# Patient Record
Sex: MALE | Race: WHITE | NOT HISPANIC OR LATINO | Employment: UNEMPLOYED | URBAN - METROPOLITAN AREA
[De-identification: names, ages, dates, MRNs, and addresses within clinical notes are randomized per-mention and may not be internally consistent; named-entity substitution may affect disease eponyms.]

---

## 2017-05-31 LAB
ALT SERPL-CCNC: 45 IU/L (ref 6–40)
AST SERPL-CCNC: 22 IU/L (ref 10–40)
CREAT SERPL-MCNC: 0.9 MG/DL (ref 0.7–1.2)

## 2018-04-11 ENCOUNTER — TRANSFERRED RECORDS (OUTPATIENT)
Dept: HEALTH INFORMATION MANAGEMENT | Facility: CLINIC | Age: 33
End: 2018-04-11

## 2018-05-08 ENCOUNTER — TRANSFERRED RECORDS (OUTPATIENT)
Dept: HEALTH INFORMATION MANAGEMENT | Facility: CLINIC | Age: 33
End: 2018-05-08

## 2018-05-10 ENCOUNTER — TRANSFERRED RECORDS (OUTPATIENT)
Dept: HEALTH INFORMATION MANAGEMENT | Facility: CLINIC | Age: 33
End: 2018-05-10

## 2018-05-11 ENCOUNTER — TRANSFERRED RECORDS (OUTPATIENT)
Dept: HEALTH INFORMATION MANAGEMENT | Facility: CLINIC | Age: 33
End: 2018-05-11

## 2018-05-11 LAB
CREAT SERPL-MCNC: 0.92 MG/DL (ref 0.7–1.2)
GLUCOSE SERPL-MCNC: 130 MG/DL (ref 70–100)
POTASSIUM SERPL-SCNC: 3.8 MEQ/L (ref 3.4–5.1)

## 2018-05-12 ENCOUNTER — TRANSFERRED RECORDS (OUTPATIENT)
Dept: HEALTH INFORMATION MANAGEMENT | Facility: CLINIC | Age: 33
End: 2018-05-12

## 2018-05-12 ENCOUNTER — HOSPITAL ENCOUNTER (INPATIENT)
Facility: HOSPITAL | Age: 33
LOS: 5 days | Discharge: HOME | DRG: 885 | End: 2018-05-17
Attending: PSYCHIATRY & NEUROLOGY | Admitting: PSYCHIATRY & NEUROLOGY
Payer: MEDICARE

## 2018-05-12 DIAGNOSIS — F25.0 SCHIZOAFFECTIVE DISORDER, BIPOLAR TYPE (H): Primary | ICD-10-CM

## 2018-05-12 PROBLEM — R45.89 SUICIDAL BEHAVIOR: Status: ACTIVE | Noted: 2018-05-12

## 2018-05-12 PROCEDURE — A9270 NON-COVERED ITEM OR SERVICE: HCPCS | Mod: GY | Performed by: NURSE PRACTITIONER

## 2018-05-12 PROCEDURE — 25000132 ZZH RX MED GY IP 250 OP 250 PS 637: Mod: GY | Performed by: NURSE PRACTITIONER

## 2018-05-12 PROCEDURE — 12400000 ZZH R&B MH

## 2018-05-12 RX ORDER — ALUMINA, MAGNESIA, AND SIMETHICONE 2400; 2400; 240 MG/30ML; MG/30ML; MG/30ML
30 SUSPENSION ORAL EVERY 4 HOURS PRN
Status: DISCONTINUED | OUTPATIENT
Start: 2018-05-12 | End: 2018-05-17 | Stop reason: HOSPADM

## 2018-05-12 RX ORDER — SULFAMETHOXAZOLE/TRIMETHOPRIM 800-160 MG
1 TABLET ORAL 2 TIMES DAILY
COMMUNITY
Start: 2018-05-11 | End: 2018-05-18

## 2018-05-12 RX ORDER — OLANZAPINE 10 MG/2ML
10 INJECTION, POWDER, FOR SOLUTION INTRAMUSCULAR
Status: DISCONTINUED | OUTPATIENT
Start: 2018-05-12 | End: 2018-05-17 | Stop reason: HOSPADM

## 2018-05-12 RX ORDER — LEDIPASVIR AND SOFOSBUVIR 90; 400 MG/1; MG/1
1 TABLET, FILM COATED ORAL DAILY
Status: ON HOLD | COMMUNITY
End: 2018-05-12

## 2018-05-12 RX ORDER — SULFAMETHOXAZOLE/TRIMETHOPRIM 800-160 MG
1 TABLET ORAL 2 TIMES DAILY
Status: DISCONTINUED | OUTPATIENT
Start: 2018-05-12 | End: 2018-05-17 | Stop reason: HOSPADM

## 2018-05-12 RX ORDER — TRAZODONE HYDROCHLORIDE 50 MG/1
50 TABLET, FILM COATED ORAL
Status: DISCONTINUED | OUTPATIENT
Start: 2018-05-12 | End: 2018-05-17 | Stop reason: HOSPADM

## 2018-05-12 RX ORDER — BISACODYL 10 MG
10 SUPPOSITORY, RECTAL RECTAL DAILY PRN
Status: DISCONTINUED | OUTPATIENT
Start: 2018-05-12 | End: 2018-05-17 | Stop reason: HOSPADM

## 2018-05-12 RX ORDER — ACETAMINOPHEN 325 MG/1
650 TABLET ORAL EVERY 4 HOURS PRN
Status: DISCONTINUED | OUTPATIENT
Start: 2018-05-12 | End: 2018-05-17 | Stop reason: HOSPADM

## 2018-05-12 RX ORDER — HYDROXYZINE HYDROCHLORIDE 25 MG/1
25-50 TABLET, FILM COATED ORAL EVERY 4 HOURS PRN
Status: DISCONTINUED | OUTPATIENT
Start: 2018-05-12 | End: 2018-05-17 | Stop reason: HOSPADM

## 2018-05-12 RX ORDER — OLANZAPINE 10 MG/1
10 TABLET ORAL
Status: DISCONTINUED | OUTPATIENT
Start: 2018-05-12 | End: 2018-05-17 | Stop reason: HOSPADM

## 2018-05-12 RX ADMIN — OLANZAPINE 10 MG: 10 TABLET, FILM COATED ORAL at 15:49

## 2018-05-12 RX ADMIN — SULFAMETHOXAZOLE AND TRIMETHOPRIM 1 TABLET: 800; 160 TABLET ORAL at 15:50

## 2018-05-12 RX ADMIN — ACETAMINOPHEN 650 MG: 325 TABLET ORAL at 15:49

## 2018-05-12 RX ADMIN — SULFAMETHOXAZOLE AND TRIMETHOPRIM 1 TABLET: 800; 160 TABLET ORAL at 20:12

## 2018-05-12 ASSESSMENT — ACTIVITIES OF DAILY LIVING (ADL)
TOILETING: 0-->INDEPENDENT
COGNITION: 0 - NO COGNITION ISSUES REPORTED
SWALLOWING: 0-->SWALLOWS FOODS/LIQUIDS WITHOUT DIFFICULTY
FALL_HISTORY_WITHIN_LAST_SIX_MONTHS: NO
DRESS: 0-->INDEPENDENT
BATHING: 0-->INDEPENDENT
TRANSFERRING: 0-->INDEPENDENT
RETIRED_COMMUNICATION: 0-->UNDERSTANDS/COMMUNICATES WITHOUT DIFFICULTY
AMBULATION: 0-->INDEPENDENT
RETIRED_EATING: 0-->INDEPENDENT

## 2018-05-12 NOTE — PLAN OF CARE
Face to face end of shift report received from [employer] completed. Patient observed laying in bed napping with regular and unlabored respirations. Pt requested bactrim, tylenol, and zyprexa that he had refused after requesting it earlier for agitation, generalized pain, and infection to right upper antecubital space of arm. Pt was admin all requested meds at 1549. No further complaints or concerns reported by pt at this time.     Charissa Starkey  5/12/2018  3:45 PM

## 2018-05-12 NOTE — PLAN OF CARE
Staff tried to fax [facility] for medical records since pt signed PAOLA.  It is sitting in the front of the chart.

## 2018-05-12 NOTE — IP AVS SNAPSHOT
STOP!!! DO NOT PRINT OR REFERENCE THIS AVS!!!  AVS displayed here is NOT the version that was given to the patient at discharge.  GO TO CHART REVIEW to print or review a copy of the AVS that was frozen/printed at time of discharge.                           MRN:[id number]                      After Visit Summary   5/12/2018    Nima Pandya    MRN: [id number]           Thank you!     Thank you for choosing [facility] for your care. Our goal is always to provide you with excellent care. Hearing back from our patients is one way we can continue to improve our services. Please take a few minutes to complete the written survey that you may receive in the mail after you visit with us. Thank you!        Patient Information     Date Of Birth          1985        Designated Caregiver       Most Recent Value    Caregiver    Will someone help with your care after discharge? no    Name of designated caregiver none    Phone number of caregiver none    Caregiver address none      About your hospital stay     You were admitted on:  May 12, 2018 You last received care in the: [facility]    You were discharged on:  May 17, 2018       Who to Call     For medical emergencies, please call 911.  For non-urgent questions about your medical care, please call your primary care provider or clinic, None          Attending Provider     Provider Specialty    Juan Jose Huang MD Psychiatry    [place], [facility]       Primary Care Provider Fax #    Physician No Ref-Primary [phone]      Further instructions from your care team       Behavioral Discharge Planning and Instructions    Summary: Nima was admitted to  with increased mental health symptoms     Main Diagnosis: Schizophrenia, paranoid type, R/o substance (amphetamine) induced psychosis, Stimulant (amphetamine) use disorder, severe, Opiate use disorder, severe    Major Treatments, Procedures and Findings: Stabilize with medications, connect  with community programs.    Symptoms to Report: feeling more aggressive, increased confusion, losing more sleep, mood getting worse or thoughts of suicide    Lifestyle Adjustment: Take all medications as prescribed, meet with doctor/ medication provider, out patient therapist, , and [facility] worker as scheduled. Abstain from alcohol or any unprescribed drugs.      Psychiatry Follow-up:     [facility]   - Kristyn - [phone]  [address] [place] [address]  Phone- [phone]     Fax- [phone]       [facility]  - referral faxed on 5/15/2018  Irma - [phone]  [address]  [place] [address]  Phone: [phone]  Fax: [phone] or [phone]    [facility]   Jackie Shoemaker   Phone: [phone]  Fax: [phone]      Resources:   Crisis Intervention: 284.944.4305 or 281-716-9492 (TTY: 513.347.1635).  Call anytime for help.  National [place] on Mental Illness (www.[place].justina.org): 693.331.7434 or 788-871-4902.  Alcoholics Anonymous (www.alcoholics-anonymous.org): Check your phone book for your local chapter.  Suicide Awareness Voices of Education (SAVE) (www.save.org): 385-089-BZDC (6554)  National Suicide Prevention Line (www.mentalhealthmn.org): 249-456-HOPL (6292)  Mental Health Consumer/Survivor Network of [place] (www.mhcsn.net): 708.557.2243 or 839-984-3051  Mental Health Association of [place] (www.mentalhealth.org): 611.348.9895 or 776-332-6786    General Medication Instructions:   See your medication sheet(s) for instructions.   Take all medicines as directed.  Make no changes unless your doctor suggests them.   Go to all your doctor visits.  Be sure to have all your required lab tests. This way, your medicines can be refilled on time.  Do not use any drugs not prescribed by your doctor.  Avoid alcohol.    [place] Area:  [facility] stabilization [facility]- [phone]  [facility] Crisis Line:  "1-929.992.9506  Advocates For Family Peace: 372-0611  Sexual Assault Program of [facility]: 428.397.1723 or 1-669.241.7921  [place] Battered Women's Program: 1-794.460.2544 Ext: 279       Calls answered Mon-Fri-8:00 am--4:30 pm    [place]:  Advocates for Family Peace: 1-283.661.7275  [facility] first call for help: 1-133.536.2967  [facility] Crisis [facility]:  (556) 923-9462      [place] Area:  Warm Line: 1-139.774.7895       Calls answered Tuesday--Saturday 4:00 pm--10:00 pm  [facility] Crisis Line - 773.742.2981  [place] [facility] Stabilization 724-977-5554    [place] Statewide:  [place] Crisis and Referral Services: 1-333.667.7782  National Suicide Prevention Lifeline: 2-321-228-TALK (6552)   - oas5qkke- Text  Life  to [address]62  First Call for Help: 2-1-1  [facility] Helpline- 3-041-BTDH-HELP    Pending Results     No orders found from 5/10/2018 to 5/13/2018.            Statement of Approval     Ordered          05/16/18 7313  I have reviewed and agree with all the recommendations and orders detailed in this document.  EFFECTIVE NOW     Approved and electronically signed by:  Laya Felix NP             Admission Information     Date & Time Department Dept. Phone    5/12/2018 [facility] [phone]      Your Vitals Were     Blood Pressure Pulse Temperature Respirations Height Weight    131/57 87 97.5  F (36.4  C) (Tympanic) 14 1.956 m (6' 5\") 119.5 kg (263 lb 7.2 oz)    Pulse Oximetry BMI (Body Mass Index)                95% 31.24 kg/m2          Novelo Information     Novelo lets you send messages to your doctor, view your test results, renew your prescriptions, schedule appointments and more. To sign up, go to [email or web address] . Click on \"Log in\" on the left side of the screen, which will take you to the Welcome page. Then click on \"Sign up Now\" on the right side of the page.     You will be asked to enter the access code listed below, as well as some personal information. Please " follow the directions to create your username and password.     Your access code is: [email or web address]  Expires: 2018 12:03 PM     Your access code will  in 90 days. If you need help or a new code, please call your [facility] clinic or [phone].        Care EveryWhere ID     This is your Care EveryWhere ID. This could be used by other organizations to access your [facility] medical records  [id number]        Equal Access to Services     LIVE DIAZ : Hadii aad ku hadasho Soomaali, waaxda luqadaha, qaybta kaalmada adeegyada, waxay dudleyin haykaterina claros. So [facility] [phone].    ATENCIÓN: Si habla español, tiene a martínez disposición servicios gratuitos de asistencia lingüística. [facility] [phone].    We comply with applicable federal civil rights laws and [place] laws. We do not discriminate on the basis of race, color, national origin, age, disability, sex, sexual orientation, or gender identity.               Review of your medicines      CONTINUE these medicines which may have CHANGED, or have new prescriptions. If we are uncertain of the size of tablets/capsules you have at home, strength may be listed as something that might have changed.        Dose / Directions    paliperidone 234 MG/1.5ML Susp   Commonly known as:  INVEGA SUSTENNA   This may have changed:  when to take this   Used for:  Schizoaffective disorder, bipolar type (H)        Dose:  234 mg   Start taking on:  2018   Inject 1.5 mLs (234 mg) into the muscle every 28 days   Quantity:  1 Syringe   Refills:  0         CONTINUE these medicines which have NOT CHANGED        Dose / Directions    sulfamethoxazole-trimethoprim 800-160 MG per tablet   Commonly known as:  BACTRIM DS/SEPTRA DS   Indication:  Abscess        Dose:  1 tablet   Take 1 tablet by mouth 2 times daily   Refills:  0         STOP taking     INVEGA PO                Where to get your medicines      These medications were sent to [id number] -  [place] - [address] [facility] [address]  [address] [place] [address]    Hours:  24-hours Phone:  [phone]     paliperidone 234 MG/1.5ML Susp                Protect others around you: Learn how to safely use, store and throw away your medicines at www.disposemymeds.org.        ANTIBIOTIC INSTRUCTION     You've Been Prescribed an Antibiotic - Now What?  Your healthcare team thinks that you or your loved one might have an infection. Some infections can be treated with antibiotics, which are powerful, life-saving drugs. Like all medications, antibiotics have side effects and should only be used when necessary. There are some important things you should know about your antibiotic treatment.      Your healthcare team may run tests before you start taking an antibiotic.    Your team may take samples (e.g., from your blood, urine or other areas) to run tests to look for bacteria. These test can be important to determine if you need an antibiotic at all and, if you do, which antibiotic will work best.      Within a few days, your healthcare team might change or even stop your antibiotic.    Your team may start you on an antibiotic while they are working to find out what is making you sick.    Your team might change your antibiotic because test results show that a different antibiotic would be better to treat your infection.    In some cases, once your team has more information, they learn that you do not need an antibiotic at all. They may find out that you don't have an infection, or that the antibiotic you're taking won't work against your infection. For example, an infection caused by a virus can't be treated with antibiotics. Staying on an antibiotic when you don't need it is more likely to be harmful than helpful.      You may experience side effects from your antibiotic.    Like all medications, antibiotics have side effects. Some of these can be serious.    Let you healthcare team know if you  have any known allergies when you are admitted to the hospital.    One significant side effect of nearly all antibiotics is the risk of severe and sometimes deadly diarrhea caused by Clostridium difficile (C. Difficile). This occurs when a person takes antibiotics because some good germs are destroyed. Antibiotic use allows C. diificile to take over, putting patients at high risk for this serious infection.    As a patient or caregiver, it is important to understand your or your loved one's antibiotic treatment. It is especially important for caregivers to speak up when patients can't speak for themselves. Here are some important questions to ask your healthcare team.    What infection is this antibiotic treating and how do you know I have that infection?    What side effects might occur from this antibiotic?    How long will I need to take this antibiotic?    Is it safe to take this antibiotic with other medications or supplements (e.g., vitamins) that I am taking?     Are there any special directions I need to know about taking this antibiotic? For example, should I take it with food?    How will I be monitored to know whether my infection is responding to the antibiotic?    What tests may help to make sure the right antibiotic is prescribed for me?      Information provided by:  www.cdc.gov/getsmart  U.S. Department of Health and Human Services  Centers for disease Control and Prevention  National Center for Emerging and Zoonotic Infectious Diseases  Division of Healthcare Quality Promotion             Medication List: This is a list of all your medications and when to take them. Check marks below indicate your daily home schedule. Keep this list as a reference.      Medications           Morning Afternoon Evening Bedtime As Needed    paliperidone 234 MG/1.5ML Susp   Commonly known as:  INVEGA SUSTENNA   Inject 1.5 mLs (234 mg) into the muscle every 28 days   Start taking on:  6/13/2018   Last time this was  given:  234 mg on 5/16/2018 10:47 AM                                sulfamethoxazole-trimethoprim 800-160 MG per tablet   Commonly known as:  BACTRIM DS/SEPTRA DS   Take 1 tablet by mouth 2 times daily   Last time this was given:  1 tablet on 5/16/2018  8:26 PM

## 2018-05-12 NOTE — PROGRESS NOTES
05/12/18 1345   Patient Belongings   Did you bring any home meds/supplements to the hospital?  No   Patient Belongings wallet   Disposition of Belongings Other (see comment)  (in belonging room)   Belongings Search Yes   Clothing Search Yes   Second Staff Charissa CORONADO   General Info Comment Grey underwear X3, Green Bracelete, Black T shirt X2, Grey T shirt X3, Black [place] jacket, Maroon underweat X2, Yellow plaid boxers, White T shirt, Pair of grey socks X11, Red shirt X2, Black underwear, Grey boxers, Tresemme conditioner, Black shorts, Green sweatshirt, black sunglasses, Tresemme shampoo, black metal lantern light, axe body wash, floss X2, axe deodorant X2, black , old spice doedorant, blue plaid boxers, red shorts X2, Blue shorts, pack of pall mall w/one cigarette in it, bag of cat treats (sealed), black shorts, folder with papers in, pack of american spirits, cell tronix power bank    List items sent to safe: Pawn sarah slip, S.S. Card, medical cards X2, [place] DL, [employer] rewards card, paypal mastercard, credit one visa card, [employer] visa debit, various business and rewards cards, black wallet  All other belongings put in assigned cubby in belongings room.     I have reviewed my belongings list on admission and verify that it is correct.     Patient signature_______________________________    Second staff witness (if patient unable to sign) ______________________________       I have received all my belongings at discharge.    Patient signature________________________________    Rylee   5/12/2018  1:59 PM

## 2018-05-12 NOTE — PLAN OF CARE
Problem: Patient Care Overview  Goal: Individualization & Mutuality  Pt will be med compliant.  Pt will agree with treatment teams recommendations  Pt will have reality based conversations  Pt elopement precautions.  Pt will be assessed daily to see if able to wean. 5/12/18 No weaning currently.    Outcome: Therapy, progress toward functional goals is gradual  Pt was initially refusing requested medications, but then right after shift change requested them again and took them this time. Pt requested bactrim, tylenol, and zyprexa that he had refused after requesting it earlier for agitation, generalized pain, and infection to right upper antecubital space of arm. Pt was admin all requested meds at 1549. Pt is still very delusional and makes comments that are sometimes illogical and tangential. Pt was brought the medications he requested the first time at around 1215 which he refused and stated he wanted us to quit pulling him back to this place and let him sleep. Request was honored to allow pt to sleep. 1337- Writer and pt RN attempted again to bring pt the meds as he appeared agitated and still needed to take the scheduled bactrim. Pt again refused and said that we were pulling him back from heaven and would appreciate it if we left him alone and that he did not want the meds at this time. Pt rambled some more incoherent comments regarding heaven and being high before sounding agitated again. Pt request to sleep and be left alone was again honored until he  Came and requested the medications himself at 1545.     Per report pt was carrying around a shovel with a plan to bury himself before being brought into the hospital. He is an IV drug user and is Hep C+. He also has diagnoses of continuous opiate dependence, with prescription for suboxone and invega sustenna injection that he receives every 28 days. Pt is agitated and mostly uncooperative with staff. He has spent the entire day shift and now going into evening  shift in bed and not wanting to be bothered with anyone or anything. He is extremely isolative and withdrawn. The zyprexa seems to have reduced pt agitation. Pt refuses to answer criteria questions and again stated that he wants to be left alone. Pt is unable to maintain a reality based conversation at this time.     Medical records received from [facility] and placed in the front of the pt chart    Problem: Overarching Goals (Adult)  Goal: Adheres to Safety Considerations for Self and Others  Outcome: Therapy, progress toward functional goals is gradual  Pt has been safe with himself     Goal: Optimized Coping Skills in Response to Life Stressors    Intervention: Promote Effective Coping Strategies  Pt is delusional at this time and still not presenting well. Coping skills are ineffective at this time and not being utilized.     Goal: Develops/Participates in Therapeutic [place] to Support Successful Transition    Intervention: Foster Therapeutic [place]  Pt desire to sleep is being honored at this time.      Problem: Thought Process Alteration (Adult)  Goal: Improved Thought Process  Pt. Will have no hallucinations by target discharge date.  Pt. Will manage a reality based conversation.    Outcome: Therapy, unable to show any progress toward functional goals  Pt refuses to answer criteria questions but appears responding and unable to maintain reality at this time.

## 2018-05-12 NOTE — IP AVS SNAPSHOT
[facility]    [address] [place] [address]    Phone:  [phone]    Fax:  [phone]                                       After Visit Summary   5/12/2018    Nima Pandya    MRN: [id number]           After Visit Summary Signature Page     I have received my discharge instructions, and my questions have been answered. I have discussed any challenges I see with this plan with the nurse or doctor.    ..........................................................................................................................................  Patient/Patient Representative Signature      ..........................................................................................................................................  Patient Representative Print Name and Relationship to Patient    ..................................................               ................................................  Date                                            Time    ..........................................................................................................................................  Reviewed by Signature/Title    ...................................................              ..............................................  Date                                                            Time
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
## 2018-05-13 PROCEDURE — A9270 NON-COVERED ITEM OR SERVICE: HCPCS | Mod: GY | Performed by: NURSE PRACTITIONER

## 2018-05-13 PROCEDURE — 12400000 ZZH R&B MH

## 2018-05-13 PROCEDURE — 99223 1ST HOSP IP/OBS HIGH 75: CPT | Performed by: NURSE PRACTITIONER

## 2018-05-13 PROCEDURE — 25000132 ZZH RX MED GY IP 250 OP 250 PS 637: Mod: GY | Performed by: NURSE PRACTITIONER

## 2018-05-13 RX ADMIN — SULFAMETHOXAZOLE AND TRIMETHOPRIM 1 TABLET: 800; 160 TABLET ORAL at 20:33

## 2018-05-13 RX ADMIN — HYDROXYZINE HYDROCHLORIDE 50 MG: 25 TABLET ORAL at 20:37

## 2018-05-13 RX ADMIN — SULFAMETHOXAZOLE AND TRIMETHOPRIM 1 TABLET: 800; 160 TABLET ORAL at 08:23

## 2018-05-13 ASSESSMENT — ACTIVITIES OF DAILY LIVING (ADL)
GROOMING: INDEPENDENT
ORAL_HYGIENE: INDEPENDENT
DRESS: SCRUBS (BEHAVIORAL HEALTH)
GROOMING: INDEPENDENT
LAUNDRY: UNABLE TO COMPLETE
ORAL_HYGIENE: INDEPENDENT

## 2018-05-13 NOTE — PLAN OF CARE
Face to face end of shift report received from Charissa HEREDIA RN. Rounding completed. Patient observed in [facility] room.     Samantha Fall  5/13/2018  4:01 PM

## 2018-05-13 NOTE — PLAN OF CARE
Problem: Patient Care Overview  Goal: Individualization & Mutuality  Pt will be med compliant.  Pt will agree with treatment teams recommendations  Pt will have reality based conversations  Pt elopement precautions.  Pt will be assessed daily to see if able to wean. 5/12/18 No weaning currently.    Outcome: No Change  Pt. Is medication compliant this shift, irritable with any questions, no weaning to open unit at this time, has been in bed all shift, will continue to monitor progress.    Problem: Thought Process Alteration (Adult)  Goal: Improved Thought Process  Pt. Will have no hallucinations by target discharge date.  Pt. Will manage a reality based conversation.    Outcome: No Change  Pt. Not willing to answer any assessment questions.

## 2018-05-13 NOTE — H&P
"Psychiatric Eval/H&P  Patient Name: Nima Pandya   YOB: 1985  Age: 32 year old  [id number]    Primary Physician: No Ref-Primary, Physician   Completed By: Sri Cage NP     CC: Admitted for psychosis    Patient is a poor historian so information is largely obtained from review of available records and brief interaction with the patient.    HPI   Nima Pandya is a 32 year old single  male who presented to the [facility] ED via law enforcement for psychosis. He had been discharged from [facility] 2 days earlier, on 5/10/18. Upon discharge at that time he was admitted to [facility] for CD treatment. Due to his erratic behavior at the treatment facility it was requested that [facility] evaluate him. Before they arrived he eloped. Father found him at his apartment and brought him to [facility]. He reportedly eloped from [facility] twice yesterday before police found him in the woods and brought him to the ED for further evaluation. He reported the use of methamphetamines, suboxone, and benzos earlier that day (5/11) to the .     Nima offers little information on assessment today and states \"I'd prefer not to talk about it. It's old news to me\". He was noted to have a large, scabbed over area on his right inner antecubital area that is currently being treated for cellulitis. This is secondary to continued IV drug use. He had initially refused antibiotics, stating \"this is mine and I have the right to have my body heal the way its wants to\". He did make several odd statements to nursing staff after admission here, noting that he needed to be left alone, \"quit pulling me back from heaven\". Unable to explain why he left treatment or [facility]. It is unclear whether he has been taking his Invega Sustenna injection, will need to verify this on Monday.           PMFSP     Past Psychiatric History:   History of multiple hospitalizations in the past. Recently " hospitalized at [facility] from 5/8/18-5/10/18 and was discharged to [facility]. Currently works with [employer] in [place]. Known history of paranoid schizophrenia. Has been under commitment in the past. Has been taking Invega Sustenna IM, has been on Abilify in the past per records and likely others.      Social History:   Per records he lived in [place] as a young child. Has been in [place] for most of his life. Has 3 brothers, one who committed suicide in 2001. He has a GED. Currently living in his father's rental house in [place], father currently lives in [place]. He is currently collecting SSDI. No children. Currently on probation until 6/2019 for theft and drug related charges. PO is Navid Swift.         Chemical Use History:   Has a significant substance abuse history. Per records he started using alcohol and marijuana at the age of 14. Used meth for the first time at age 16. He was 27 years old when he started using opiates regularly, including suboxone and heroin. Has also used methamphetamine regularly since. Has been using Subutex IV. Currently has area of cellulitis on right inner antecubital area from IV drug use. Reported in the ED that he had last used suboxone, benzos, methamphetamine, and a shot of alcohol the day prior to coming to the ED. Has been to CD treatment several times, including [facility] in October 2017. Was discharged from the hospital on 5/10/18 to [facility] for treatment, though eloped.       Family Psychiatric History:   Unknown at this time.        Medical History and ROS  No current outpatient prescriptions on file.     No Known Allergies     Past medical history:  Hepatitis C+    No past surgical history on file.      Physical Exam    Constitutional: oriented to person, place, and time.    HENT:   Head: Normocephalic and atraumatic.   Right Ear: External ear normal.   Left Ear: External ear normal.   Nose: Nose normal.   Mouth/Throat: Oropharynx is clear and moist.    Eyes: Conjunctivae and EOM are  "normal.   Neck: Normal range of motion.    Cardiovascular: Normal rate, regular rhythm  Pulmonary/Chest: Effort normal and breath sounds normal.   Abdominal: Soft. Bowel sounds are normal.    Skin: Large scabbed over area to right inner antecubital area, skin reddened, possible cellulitis    Review of Systems:  Constitution: No weight loss, fever, night sweats  Skin: No rashes, pruritus or open wounds  Neuro: No headaches or seizure activity.  Psych:  See HPI  Eyes: No vision changes.  ENT: No problems chewing or swallowing.   Musculoskeletal: No muscle pain, joint pain or swelling   Respiratory: No cough or dyspnea  Cardiovascular:  No chest pain,  palpitations or fainting  Gastrointestinal:  No abdominal pain, nausea, vomiting or change in bowel habits         MSE/PSYCH  PSYCHIATRIC EXAM  /87  Pulse 97  Temp 98.2  F (36.8  C) (Tympanic)  Resp 16  Ht 1.956 m (6' 5\")  Wt 119.5 kg (263 lb 7.2 oz)  SpO2 95%  BMI 31.24 kg/m2  -Appearance/Behavior:  No apparent distress, Casually groomed and Appears stated age    -Attitude:guarded, irritable  -Motor: normal or unremarkable  -Gait: not observed as he is laying in bed, reportedly normal   -Abnormal involuntary movements: none noted  -Mood: irritable, labile  -Affect: Labile.  -Speech: regular rate and rhythm, coherent                 -Thought process/associations: Tangential, difficult to assess as he offers very little during assessment  -Thought content: difficult to assess, has been making delusional statements to nursing staff  -Perceptual disturbances: denies hallucinations, though appears somewhat peroccupied              -Suicidal/Homicidal Ideation: denies any suicidal or homicidal ideation  -Judgment: Limited.  -Insight: Limited.  *Orientation: time, place and person.  *Memory: likely impaired secondary to drug use  *Attention: short, refuses to speak with me  *Language: fluent, no aphasias, able to repeat phrases and name objects. Vocab " "intact.  *Fund of information: appropriate for education  *Cognitive functioning estimate: 0 - independent.     Labs:   Preadmission labs reviewed and in paper chart. Utox +amphetamines, ETOH negative. BMP is WNL. CBC remarkable only for slight elevation in WBC 11.6. Ultrasound completed on right upper arm to rule out abscess- \"no focal fluid collection identified\".     Assessment/Impression: This is a 32 year old yo male with a history of schizophrenia and polysubstance abuse. Was recently hospitalized and discharged to  treatment, though eloped the following day. He admitted to the use of benzos, meth, suboxone, and alcohol in the morning prior to coming to ED. He is an IV drug user and has large scabbed over area on right inner antecubital injection site which is currently being treated for cellulitis. Is taking antibiotics for this. He is uncooperative with assessment today. It is unclear whether he has been taking his Invega Sustenna injection, will need to verify with [facility] on Monday when this is due.       Educated regarding medication indications, risks, benefits, side effects, contraindications and possible interactions. Verbally expressed understanding.     DX:  Schizophrenia, paranoid type  R/o substance (amphetamine) induced psychosis  Stimulant (amphetamine) use disorder, severe  Opiate use disorder, severe     Plan:  Admit to Unit: [address] [place] [address]  Attending: Sri Cage CNP  Patient is on a 72 hour mental health hold  Other routine labs were notable for utox +amphetamines  Monitor for target symptoms: decrease in psychosis  Provide a safe environment and therapeutic milieu.   Continue Invega Sustenna- need to verify when next dose is due  Bactrim DS BID x 7 days for likely cellulitis of right arm, started in ED    Anticipated length of stay: 3-5 days       CLOVER Martinez, LAUREL  "

## 2018-05-13 NOTE — PLAN OF CARE
Face to face end of shift report received from pm RN. Rounding completed. Patient observed.     Jose Bynum  5/12/2018  11:53 PM

## 2018-05-13 NOTE — PHARMACY
[facility]    Pharmacy      Antimicrobial Stewardship Note     Current antimicrobial therapy:  Anti-infectives (Future)    Start     Dose/Rate Route Frequency Ordered Stop    05/12/18 1315  sulfamethoxazole-trimethoprim (BACTRIM DS/SEPTRA DS) 800-160 MG per tablet 1 tablet      1 tablet Oral 2 TIMES DAILY 05/12/18 1303 05/18/18 2059          Indication: Abscess, SSTI    Days of Therapy: 2     Pertinent labs:  Creatinine No results found for: CR  WBC No results found for: WBC  Procalcitonin No results found for: PCAL  CRP No results found for: CRP    Culture Results: None     Recommendations/Interventions:  1. No recommendations at this time    Ashleigh Hutton, [facility]  May 13, 2018

## 2018-05-13 NOTE — PLAN OF CARE
Face to face end of shift report received from Jose TERRY RN. Rounding completed. Patient observed.     Charissa Youngblood  5/13/2018  7:41 AM

## 2018-05-14 ENCOUNTER — OFFICE VISIT (OUTPATIENT)
Dept: WOUND CARE | Facility: OTHER | Age: 33
DRG: 885 | End: 2018-05-14
Attending: PSYCHIATRY & NEUROLOGY
Payer: MEDICARE

## 2018-05-14 DIAGNOSIS — T14.8XXA WOUND OF SKIN: Primary | ICD-10-CM

## 2018-05-14 PROCEDURE — 12400000 ZZH R&B MH

## 2018-05-14 PROCEDURE — A9270 NON-COVERED ITEM OR SERVICE: HCPCS | Mod: GY | Performed by: NURSE PRACTITIONER

## 2018-05-14 PROCEDURE — 25000132 ZZH RX MED GY IP 250 OP 250 PS 637: Mod: GY | Performed by: NURSE PRACTITIONER

## 2018-05-14 PROCEDURE — 99232 SBSQ HOSP IP/OBS MODERATE 35: CPT | Performed by: NURSE PRACTITIONER

## 2018-05-14 PROCEDURE — 99232 SBSQ HOSP IP/OBS MODERATE 35: CPT | Performed by: CLINICAL NURSE SPECIALIST

## 2018-05-14 RX ORDER — CLONIDINE HYDROCHLORIDE 0.1 MG/1
0.1 TABLET ORAL 3 TIMES DAILY PRN
Status: DISCONTINUED | OUTPATIENT
Start: 2018-05-14 | End: 2018-05-16

## 2018-05-14 RX ADMIN — NICOTINE POLACRILEX 4 MG: 2 GUM, CHEWING ORAL at 20:15

## 2018-05-14 RX ADMIN — OLANZAPINE 10 MG: 10 TABLET, FILM COATED ORAL at 13:24

## 2018-05-14 RX ADMIN — SULFAMETHOXAZOLE AND TRIMETHOPRIM 1 TABLET: 800; 160 TABLET ORAL at 20:15

## 2018-05-14 RX ADMIN — CLONIDINE HYDROCHLORIDE 0.1 MG: 0.1 TABLET ORAL at 13:24

## 2018-05-14 RX ADMIN — HYDROXYZINE HYDROCHLORIDE 50 MG: 25 TABLET ORAL at 20:15

## 2018-05-14 RX ADMIN — NICOTINE POLACRILEX 4 MG: 2 GUM, CHEWING ORAL at 18:36

## 2018-05-14 RX ADMIN — SULFAMETHOXAZOLE AND TRIMETHOPRIM 1 TABLET: 800; 160 TABLET ORAL at 08:27

## 2018-05-14 ASSESSMENT — ACTIVITIES OF DAILY LIVING (ADL)
DRESS: SCRUBS (BEHAVIORAL HEALTH);INDEPENDENT
GROOMING: INDEPENDENT
ORAL_HYGIENE: INDEPENDENT
GROOMING: WITH SUPERVISION
LAUNDRY: UNABLE TO COMPLETE

## 2018-05-14 NOTE — PHARMACY
[facility]    Pharmacy      Antimicrobial Stewardship Note     Current antimicrobial therapy:  Anti-infectives (Future)    Start     Dose/Rate Route Frequency Ordered Stop    05/12/18 1315  sulfamethoxazole-trimethoprim (BACTRIM DS/SEPTRA DS) 800-160 MG per tablet 1 tablet      1 tablet Oral 2 TIMES DAILY 05/12/18 1303 05/18/18 2059          Indication: abscess, SSTI    Days of Therapy: 3     Pertinent labs:  Creatinine No results found for: CR  WBC No results found for: WBC  Procalcitonin No results found for: PCAL  CRP No results found for: CRP    Culture Results: none     Recommendations/Interventions:  1. Wound due to IV drug use, most likely.  Follow wound healing, if no progress, switch to an antibiotic with broader coverage.      Dipti Alas RPH  May 14, 2018

## 2018-05-14 NOTE — MR AVS SNAPSHOT
"              After Visit Summary   2018    Nima Pandya    MRN: [id number]           Patient Information     Date Of Birth          1985        Visit Information        Provider Department      2018 9:30 AM Mandy Rueda APRN CNS [facility]        Today's Diagnoses     Wound of skin    -  1       Follow-ups after your visit        Who to contact     If you have questions or need follow up information about today's clinic visit or your schedule please contact [facility] directly at [phone].  Normal or non-critical lab and imaging results will be communicated to you by [employer], letter or phone within 4 business days after the clinic has received the results. If you do not hear from us within 7 days, please contact the clinic through [employer] or phone. If you have a critical or abnormal lab result, we will notify you by phone as soon as possible.  Submit refill requests through [employer] or call your pharmacy and they will forward the refill request to us. Please allow 3 business days for your refill to be completed.          Additional Information About Your Visit        MyChart Information     [employer] lets you send messages to your doctor, view your test results, renew your prescriptions, schedule appointments and more. To sign up, go to [email or web address] . Click on \"Log in\" on the left side of the screen, which will take you to the Welcome page. Then click on \"Sign up Now\" on the right side of the page.     You will be asked to enter the access code listed below, as well as some personal information. Please follow the directions to create your username and password.     Your access code is: [email or web address]  Expires: 2018 12:03 PM     Your access code will  in 90 days. If you need help or a new code, please call your [facility] or [phone].        Care EveryWhere ID     This is your Care EveryWhere ID. This could be used by other " organizations to access your [facility] medical records  [id number]         Blood Pressure from Last 3 Encounters:   05/14/18 135/68    Weight from Last 3 Encounters:   05/13/18 119.5 kg (263 lb 7.2 oz)              Today, you had the following     No orders found for display         Today's Medication Changes      Notice     This visit is during an admission. Changes to the med list made in this visit will be reflected in the After Visit Summary of the admission.             Primary Care Provider Fax #    Physician No Ref-Primary [phone]       No address on file        Equal Access to Services     [facility]: Hadii brian casas [place], waaxda luqadaha, qaybta kaalmada bharathyaashley, dudley hooker . So [facility] [phone].    ATENCIÓN: Si habla español, tiene a martínez disposición servicios gratuitos de asistencia lingüística. Llame al [phone].    We comply with applicable federal civil rights laws and [place] laws. We do not discriminate on the basis of race, color, national origin, age, disability, sex, sexual orientation, or gender identity.            Thank you!     Thank you for choosing [facility]  for your care. Our goal is always to provide you with excellent care. Hearing back from our patients is one way we can continue to improve our services. Please take a few minutes to complete the written survey that you may receive in the mail after your visit with us. Thank you!             Your Updated Medication List - Protect others around you: Learn how to safely use, store and throw away your medicines at www.disposemymeds.org.      Notice     This visit is during an admission. Changes to the med list made in this visit will be reflected in the After Visit Summary of the admission.

## 2018-05-14 NOTE — CONSULTS
SUBJECTIVE:  Nima Pandya, 32 year old, male presents with the following Chief Complaint(s) with HPI to follow: Wound Care       HPI:  Wound care was asked to consult on a wound on Nima's right arm, superior to the antecubital area.      Patient is cooperative with exam but not willing to discuss history today, therefore information is gleaned from the medical record and report from the nurses.  It is reported that the injury is due to IV drug use.  The area was scabbed on admission, but he picked the scab off on 5/13/2018 and requested a band aid to cover it.  He was started on Bactrim DS in the ED.  He denies pain at this time and is a little exasperated that I even want to look at the wound today.    Patient Active Problem List   Diagnosis     Suicidal behavior       No past medical history on file.    No past surgical history on file.    No family history on file.    Social History   Substance Use Topics     Smoking status: Not on file     Smokeless tobacco: Not on file     Alcohol use Not on file       No current outpatient prescriptions on file.       No Known Allergies    REVIEW OF SYSTEMS  Skin: as above  Respiratory: No shortness of breath  Musculoskeletal: negative and injury to right arm, superior to antecubital area  Psychiatric: illegal drug usage    OBJECTIVE:    B/P: 135/68, T: 99.6, P: 70, R: 16, W: 263 lbs 7.2 oz, BMI: Body mass index is 31.24 kg/(m^2).  Constitutional: alert and no distress  Respiratory:  Good diaphragmatic excursion.  Musculoskeletal: extremities normal- no gross deformities noted and normal muscle tone  Skin:        Wound description:     Type of Wound:  ulcer   Location:  Right arm, superior to antecubital area    Drainage amount:  small   Drainage color:  red   Odor:  none   Wound bed:  red   Surrounding skin:  Edges attached        Measurements:  2.3 x 1.7 cm   Pain:  denies       Dressing change:      Wound dressed with oil emulsion gauze, covered with dry gauze,  affixed with medipore tape.      Psychiatric: mentation appears normal and irritated with cares    THERAPY GOAL:    Prevent infection   Moisture balance   Wound healing    ASSESSMENT / PLAN:    Comments:  We encouraged him to shower daily to clean the wound and then have the dressing replaced.  He verbalized understanding but staff report that he mostly lays in bed and has not been interested in showering.    Plan:  Daily dressing change with oil emulsion gauze, covered with dry gauze, affixed with medipore tape.  It is ok to remove the dressing for shower daily, then apply new dressing.    Follow-up as needed for acute concerns.    Mandy Rueda CNS  Surgery and Wound Care  [place] [facility]

## 2018-05-14 NOTE — PLAN OF CARE
Problem: Patient Care Overview  Goal: Team Discussion  Team Plan:   BEHAVIORAL TEAM DISCUSSION    Participants: Margarita Reyes NP, Olivia Felipe NP,  Sri Cage NP, Louise Calderon LSW,  Ailny Grant LSW, Joanna Mendez [facility], Sonja Dejesus RN, Aicha Schaefer Recreation Therapy, Olivia Santiago OT  Progress: New patient   Continued Stay Criteria/Rationale: Continue Invega Sustenna   Medical/Physical: Hep C, Cellulitis on right arm   Precautions:   Behavioral Orders   Procedures     Code 1 - Restrict to Unit     Routine Programming     As clinically indicated     Status 15     Every 15 minutes.     Plan: SW to assess, Stabilize, Verify when next injection is due, Possibly file petition for commitment   Rationale for change in precautions or plan: None

## 2018-05-14 NOTE — PLAN OF CARE
Face to face end of shift report received from pm RN. Rounding completed. Patient observed.     Jose Bynum  5/13/2018  11:52 PM

## 2018-05-14 NOTE — PLAN OF CARE
Problem: Patient Care Overview  Goal: Individualization & Mutuality  Pt will be med compliant.  Pt will agree with treatment teams recommendations  Pt will have reality based conversations  Pt elopement precautions.  Pt will not wean today; To be readdressed daily.   Outcome: No Change  Patient remains in MHICU and is not weaning today. Patient irritable throughout the day, ate some of breakfast and some of lunch meal. Patient resting on bed and sleeping throughout the day. Tells writer that he really needs something for withdrawal symptoms. I asked what types of symptoms he was having, he said a general feeling of being unbearable uncomfortable. Wound care here this morning and addressed wound to his right antecubital area. Wound is 2 inches in diameter and has a smaller center that is scabbed. Patient allowed dressing change of oil infused bandage and covered in gauze by the wound care department. Vital signs are 126/74 99 temp p 83 r 16 and PSO2 at 91% RA at noon. Release of information signed for the [facility] team in [place].    1330 Patient offered Zyprexa 10 mg po and Clonodine 0.1 mg po given for withdrawal symptoms. Patient denied having any auditory/visual hallucinations.    Problem: Thought Process Alteration (Adult)  Goal: Improved Thought Process  Pt. Will have no hallucinations by target discharge date.  Pt. Will manage a reality based conversation.    Outcome: Improving  Does not endorse hallucinations. Is able to have a reality based conversation.

## 2018-05-14 NOTE — PLAN OF CARE
"Social Service Psychosocial Assessment  Presenting Problem:   Patient presented to [facility] ED with father for psychosis. Patient was recently discharged from [facility] two days ago for erratic behavior. Patient has been decompensating with increased delusions that have been focused around God.   *Patient was reluctant to provide much information, most information was taken from charts.    Marital Status:   Single   Spouse / Children:    None   Psychiatric TX HX:  Patient has a history of paranoid schizophrenia. Has a history of several IP hospitalizations with the most recent being a few days ago at [facility] and prior to that was at [facility] in 2013. Patient has been committed in the past with the most recent being in 2013 or 2014. Patient has been working with the [facility] team for the past 10 years.   Suicide Risk Assessment:  Patient denied being suicidal on admission, denied being suicidal today and reports no history of SA   Access to Lethal Means (explain):   None   Family Psych HX:   Patient had a brother that completed suicide, but denied any other family history of mental health   A & Ox:   x3  Medication Adherence:   Unknown   Medical Issues:   See H&P   Visual -Motor Functioning:   No issues noted   Communication Skills /Needs:   No issues noted   Ethnicity:   White     Spirituality/Jewish Affiliation:   None reported Clergy Request:   No   History:   None   Living Situation:   Patient lives in [place] with a roommate -   ADL s:  Independent   Education:  Did not  Graduate HS - obtained GED   Financial Situation:   SSDI   Occupation:  Unemployed   Leisure & Recreation:  \"staying home and playing video games\"   Childhood History:   Reports being in [place] for most of his life - grew up with both mom and dad and has three brothers - reports he is close to his family.   Trauma Abuse HX:   Patient denied any history of trauma or abuse as a child or adult, but records indicate patient " "replied \"yes\" when asked before.   Relationship / Sexuality:   Not currently in a relationship - heterosexual   Substance Use/ Abuse:   Patient has a long history of substance use - reports using alcohol at the age of 14, meth at the age of 16 and regularly using opiates (heroin and suboxone) at the age of 27 and obtaining subutex illegally - reports recently using methamphetamines a few days prior to last admission at [facility] - Utox was positive for buprenorphine and amphetamines -   Chemical Dependency Treatment HX:   Patient had a Rule 25 completed in April and was set to go to [facility] two weeks ago, but patient refused to go at that time - Patient discharged directly to [facility] from previous IP and eloped for tx  - patient has a history of treatment at [facility] and [facility] -    Legal Issues:   Patient is currently on probation until June of 2019 for theft and drug related charges - PO is Navid Swift - patient is court ordered to enter into treatment   Significant Life Events:   None reported   Strengths:   Supportive father, has services   Challenges /Limitation:   Substance use, mental health   Patient Support Contact (Include name, relationship, number, and summary of conversation):  Patient gave verbal permission to contact Joycelyn with [facility] to nurse [facility]   Interventions:        Medical/Dental Care    CD Evaluation/Rule 25/Aftercare - [facility]?     Medication Management - Dr. Ridley     Individual Therapy - none not interested     Case Management - Kristyn with [facility]/C    Insurance Coverage - Medicare    Commit/Dahl Screening - possibly?     Suicide Risk Assessment - Patient denied being suicidal on admission, denied being suicidal today and reports no history of SA     High Risk Safety Plan - Talk to supports; Call crisis lines; Go to local ER if feeling suicidal.  Ailyn Grant  5/14/2018  8:46 AM    "

## 2018-05-14 NOTE — PLAN OF CARE
"Problem: Patient Care Overview  Goal: Individualization & Mutuality  Pt will be med compliant.  Pt will agree with treatment teams recommendations  Pt will have reality based conversations  Pt elopement precautions.  Pt will be assessed daily to see if able to wean. 5/12/18 No weaning currently.    Outcome: No Change  Patient in bed most of shift. Was compliant with assessment questioning. Denies all criteria. Affect is flat, mood is calm. Responses are minimal, does make eye contact, with eyes rolling during responses. Did pick scab on right antecubital area, no drainage or infection noted, did apply bandage per patient's request, patient encouraged not to pick at scab. Does come to nurse's station window with requests. While filling out breakfast menu, states \"I don't care, just give me whatever\" after stating a few food items he would like. Has been appropriate with staff and peers.   2037-requested and accepted Atarax 50 mg PO for anxiety.     Problem: Thought Process Alteration (Adult)  Goal: Improved Thought Process  Pt. Will have no hallucinations by target discharge date.  Pt. Will manage a reality based conversation.    Outcome: No Change  Patient denies hallucinations, but does appear to be responding to internal stimuli. Unwilling to engage in conversation.       "

## 2018-05-14 NOTE — PROGRESS NOTES
"[facility]  Psychiatric Progress Note      Impression:   This is a 32 year old yo male with a history of schizophrenia and polysubstance abuse.    Nima is laying in bed when I see him today. He tells me that he does want to go back to treatment. When asked why he left he replied \"I just couldn't keep still. I was going crazy. I had to leave. And that place has rules that you can't leave right away\". He does report feeling \"uncomfortable\" and requested something for withdrawal. He states that he is withdrawing from Subutex. He does agree to try Clonidine. He states that he hopes that someone will prescribe him Suboxone at treatment, as he feels that if he is able to get a prescription legally he would stop using illegal drugs. Wound care did come to assess the wound to inner antecubital area and ordered dressing changes daily, continues on antibiotic. He reports being frustrated that he is in the hospital. Not making any delusional or paranoid statements today. LINDA will get in touch with his  to determine if he is able to go back to [facility] at some point or what plan will be. Discussed possible need for commitment, though he is currently court ordered to complete treatment.      Educated regarding medication indications, risks, benefits, side effects, contraindications and possible interactions. Verbally expressed understanding.        DIagnoses:   Schizophrenia, paranoid type  R/o substance (amphetamine) induced psychosis  Stimulant (amphetamine) use disorder, severe  Opiate use disorder, severe      Attestation:  Patient has been seen and evaluated by me,  Sri Cage NP          Interim History:   The patient's care was discussed with the treatment team and chart notes were reviewed.          Medications:     Current Facility-Administered Medications Ordered in Epic   Medication Dose Route Frequency Last Rate Last Dose     acetaminophen (TYLENOL) tablet 650 mg  650 mg Oral Q4H PRN   650 " "mg at 05/12/18 1549     alum & mag hydroxide-simethicone (MYLANTA ES/MAALOX  ES) suspension 30 mL  30 mL Oral Q4H PRN         bisacodyl (DULCOLAX) Suppository 10 mg  10 mg Rectal Daily PRN         cloNIDine (CATAPRES) tablet 0.1 mg  0.1 mg Oral TID PRN   0.1 mg at 05/14/18 1324     hydrOXYzine (ATARAX) tablet 25-50 mg  25-50 mg Oral Q4H PRN   50 mg at 05/13/18 2037     magnesium hydroxide (MILK OF MAGNESIA) suspension 30 mL  30 mL Oral At Bedtime PRN         nicotine polacrilex (NICORETTE) gum 2-4 mg  2-4 mg Buccal Q1H PRN         OLANZapine (zyPREXA) tablet 10 mg  10 mg Oral Q2H PRN   10 mg at 05/14/18 1324    Or     OLANZapine (zyPREXA) injection 10 mg  10 mg Intramuscular Q2H PRN         [START ON 5/16/2018] paliperidone (INVEGA SUSTENNA) injection SUSP 234 mg  234 mg Intramuscular Q28 Days         sulfamethoxazole-trimethoprim (BACTRIM DS/SEPTRA DS) 800-160 MG per tablet 1 tablet  1 tablet Oral BID   1 tablet at 05/14/18 0827     traZODone (DESYREL) tablet 50 mg  50 mg Oral At Bedtime PRN         No current [facility]-ordered outpatient prescriptions on file.          10 point ROS reviewed- has large intact scabbed area to inner antecubital, treating for cellulitis       Allergies:   No Known Allergies         Psychiatric Examination:   /74  Pulse 83  Temp 99  F (37.2  C) (Tympanic)  Resp 16  Ht 1.956 m (6' 5\")  Wt 119.5 kg (263 lb 7.2 oz)  SpO2 92%  BMI 31.24 kg/m2  Weight is 263 lbs 7.2 oz  Body mass index is 31.24 kg/(m^2).    Appearance:  awake, alert, dressed in hospital scrubs and appeared as age stated  Attitude:  cooperative and guarded  Eye Contact:  fair  Mood: irritable  Affect:  mood congruent  Speech:  clear, coherent  Psychomotor Behavior:  no evidence of tardive dyskinesia, dystonia, or tics  Thought Process:  linear  Associations:  no loose associations  Thought Content:  no evidence of suicidal ideation or homicidal ideation and no evidence of psychotic thought, denies " hallucinations  Insight:  limited  Judgment:  limited  Oriented to:  time, person, and place  Attention Span and Concentration:  limited  Recent and Remote Memory:  fair  Fund of Knowledge: appropriate for education  Muscle Strength and Tone: normal  Gait and Station: Normal           Labs:   No results found for this or any previous visit (from the past 24 hour(s)).           Plan:   Invega Sustenna  mg due 5/16/18   Clonidine 0.1 mg TID prn anxiety/withdrawal  Consider petition for commitment    ELOS: likely >5 days, going through withdrawal, need to stabilize prior to returning to treatment

## 2018-05-14 NOTE — PLAN OF CARE
Face to face end of shift report received from Donna COYNE RN. Rounding completed. Patient observed in bed with eyes closed. Unlabored respirations.     Ana Maria Gil  5/14/2018  4:22 PM

## 2018-05-14 NOTE — PLAN OF CARE
Problem: Patient Care Overview  Goal: Discharge Needs Assessment  Outcome: No Change  Spoke with pt , Kristyn, after pt signed an PAOLA. Kristyn reports pt has been with [facility] for about 10 years is currently voluntary with them, but has been very disengaged with them for the past 6+ months states one of the pt treatment goals is to transition off of [facility] services. Kristyn reports pt is usually very calm and polite at baseline and this is very typical of cycles in the past for pt and symptoms are usually exasperated by substance use - pt does think the shot is helpful with symptoms when pt is doing well, but has been guarded when discussing symptoms recently. Kristyn reports pt has two previous commitments with the last one being in either 2013 or 2014. She reports pt has been making concerning comments about death and dying, but has never attempted suicide and this has been a cause for concern with pt father and roommate due to pt erratic behavior which has resulted in the police being called twice recently. She states that pt is court ordered to attend treatment and just recently signed an PAOLA for his  during his last admission for [facility] as he has stated previously he does not want them to be in contact with his PO. She reports pt can be referred back to [facility] when stabilized, but at this time has been discharged from their treatment program.

## 2018-05-14 NOTE — PLAN OF CARE
Face to face end of shift report received from Jose RODRÍGUEZ RN. Rounding completed. Patient observed.     Donna Dugan  5/14/2018  8:45 AM

## 2018-05-15 PROCEDURE — 25000132 ZZH RX MED GY IP 250 OP 250 PS 637: Mod: GY | Performed by: NURSE PRACTITIONER

## 2018-05-15 PROCEDURE — A9270 NON-COVERED ITEM OR SERVICE: HCPCS | Mod: GY | Performed by: NURSE PRACTITIONER

## 2018-05-15 PROCEDURE — 12400000 ZZH R&B MH

## 2018-05-15 PROCEDURE — 99232 SBSQ HOSP IP/OBS MODERATE 35: CPT | Performed by: NURSE PRACTITIONER

## 2018-05-15 RX ORDER — NICOTINE 21 MG/24HR
1 PATCH, TRANSDERMAL 24 HOURS TRANSDERMAL DAILY
Status: DISCONTINUED | OUTPATIENT
Start: 2018-05-15 | End: 2018-05-17 | Stop reason: HOSPADM

## 2018-05-15 RX ADMIN — HYDROXYZINE HYDROCHLORIDE 50 MG: 25 TABLET ORAL at 20:33

## 2018-05-15 RX ADMIN — NICOTINE POLACRILEX 4 MG: 2 GUM, CHEWING ORAL at 18:19

## 2018-05-15 RX ADMIN — SULFAMETHOXAZOLE AND TRIMETHOPRIM 1 TABLET: 800; 160 TABLET ORAL at 20:33

## 2018-05-15 RX ADMIN — NICOTINE 1 PATCH: 21 PATCH, EXTENDED RELEASE TRANSDERMAL at 10:22

## 2018-05-15 RX ADMIN — SULFAMETHOXAZOLE AND TRIMETHOPRIM 1 TABLET: 800; 160 TABLET ORAL at 08:37

## 2018-05-15 RX ADMIN — OLANZAPINE 10 MG: 10 TABLET, FILM COATED ORAL at 16:36

## 2018-05-15 RX ADMIN — ACETAMINOPHEN 650 MG: 325 TABLET ORAL at 20:33

## 2018-05-15 RX ADMIN — NICOTINE POLACRILEX 4 MG: 2 GUM, CHEWING ORAL at 08:37

## 2018-05-15 ASSESSMENT — ACTIVITIES OF DAILY LIVING (ADL)
GROOMING: INDEPENDENT
ORAL_HYGIENE: INDEPENDENT
DRESS: SCRUBS (BEHAVIORAL HEALTH)

## 2018-05-15 NOTE — PLAN OF CARE
Problem: Patient Care Overview  Goal: Team Discussion  Team Plan:   BEHAVIORAL TEAM DISCUSSION    Participants: Margarita Reyes NP, Olivia Felipe NP, Sri Cage NP, Louise Calderon LSW, Ailyn Grant LSW, Joanna Mendez [facility], Elena Allan RN, Aicha Schaefer [facility] Therapy, Olivia Santiago OT, OT student, SW student  Progress: fair, appropriate to wean  Continued Stay Criteria/Rationale: starting Invega systena tomorrow  Medical/Physical: withdrawal, cellulitis  Precautions:   Behavioral Orders   Procedures     Code 1 - Restrict to Unit     Routine Programming     As clinically indicated     Status 15     Every 15 minutes.     Plan: Invega systena due tomorrow, possibly filing for committment  Rationale for change in precautions or plan: None

## 2018-05-15 NOTE — PLAN OF CARE
Problem: Patient Care Overview  Goal: Individualization & Mutuality  Pt will be med compliant.  Pt will agree with treatment teams recommendations  Pt will have reality based conversations  Pt elopement precautions.  5/15/2018- Patient may wean to open unit if behavior appropriate. Must be compliant with safety searches and returning to MHICU.    Outcome: Therapy, progress toward functional goals is gradual  Pt. Has been medication compliant, going along with treatment team recommendations, had reality based conversation with this writer, weaned to open unit for 3.5 hours appropriately, walked back to room and is laying in bed, will continue to monitor.    Problem: Thought Process Alteration (Adult)  Goal: Improved Thought Process  Pt. Will have no hallucinations by target discharge date.  Pt. Will manage a reality based conversation.    Outcome: Therapy, progress toward functional goals is gradual  Pt. Denies hallucinations, had reality based conversation with this writer, will continue to monitor.  1640- Pt. Requested/ received zyprexa 10 mg po for high anxiety.

## 2018-05-15 NOTE — PLAN OF CARE
Face to face end of shift report received from Nile TERRAZAS RN. Rounding completed. Patient observed.     Charissa Youngblood  5/15/2018  3:59 PM

## 2018-05-15 NOTE — PLAN OF CARE
Face to face end of shift report received from Ana Maria CORONADO RN. Rounding completed. Patient observed.     Shiloh Soliman  5/15/2018  12:00 AM

## 2018-05-15 NOTE — PLAN OF CARE
"Problem: Patient Care Overview  Goal: Individualization & Mutuality  Pt will be med compliant.  Pt will agree with treatment teams recommendations  Pt will have reality based conversations  Pt elopement precautions.  Pt will not wean today; To be readdressed daily.   Outcome: No Change  Patient minimally cooperative with nursing assessment. Patient appears tense. Speech is quiet and uses minimal words to respond. Denies depression, SI, HI, pain, and hallucinations. Agrees to contact staff if having thoughts of self harm. Patient withdrawn to self and lies in bed majority of shift. Reports not feeling well but does not wish to elaborate. Cooperative with HS medications and receptive to PRN Atarax 50 mg at 2015 to help anxiety. Patient requesting Nicotine patch (sticky note done). When asked how much he smoked he replied with \"alot\". Did not wean this shift and made no attempt at elopement. Agrees to make needs known.     Problem: Thought Process Alteration (Adult)  Goal: Improved Thought Process  Pt. Will have no hallucinations by target discharge date.  Pt. Will manage a reality based conversation.    Outcome: No Change  Denies hallucinations and does not appear to be responding to internal stimuli this shift.       "

## 2018-05-15 NOTE — PROGRESS NOTES
[facility]  Psychiatric Progress Note      Impression:   This is a 32 year old yo male with a history of schizophrenia and polysubstance abuse.    Nima is sitting in the lounge on the open unit when I see him today. He is calm, though somewhat irritable. His answers to my questions are short. He denies any hallucinations. He asks what the plan is, discussed that since he is court ordered to go to [facility] treatment, recommendation would be to return to treatment once stable. We discussed possible commitment. His PO was contacted and is recommending that he go back to treatment or his probation will be violated and he will go to half-way. Nima is aware of this and is agreeable to stay voluntarily and return to treatment. If at some point he would request to leave, consideration could be given to filing a petition with the court. He was seen by wound care yesterday and daily dressing changes were ordered to ulcer on his right arm. He has been compliant with taking ordered antibiotic, though did question why he needed to continue to take it. Invega Sustenna dose is due tomorrow per [facility], will be ordered.     Educated regarding medication indications, risks, benefits, side effects, contraindications and possible interactions. Verbally expressed understanding.        DIagnoses:   Schizophrenia, paranoid type  R/o substance (amphetamine) induced psychosis  Stimulant (amphetamine) use disorder, severe  Opiate use disorder, severe      Attestation:  Patient has been seen and evaluated by me,  Sri Cage NP          Interim History:   The patient's care was discussed with the treatment team and chart notes were reviewed.          Medications:     Current Facility-Administered Medications Ordered in Epic   Medication Dose Route Frequency Last Rate Last Dose     acetaminophen (TYLENOL) tablet 650 mg  650 mg Oral Q4H PRN   650 mg at 05/12/18 1549     alum & mag hydroxide-simethicone (MYLANTA ES/MAALOX  ES) suspension  "30 mL  30 mL Oral Q4H PRN         bisacodyl (DULCOLAX) Suppository 10 mg  10 mg Rectal Daily PRN         cloNIDine (CATAPRES) tablet 0.1 mg  0.1 mg Oral TID PRN   0.1 mg at 05/14/18 1324     hydrOXYzine (ATARAX) tablet 25-50 mg  25-50 mg Oral Q4H PRN   50 mg at 05/13/18 2037     magnesium hydroxide (MILK OF MAGNESIA) suspension 30 mL  30 mL Oral At Bedtime PRN         nicotine polacrilex (NICORETTE) gum 2-4 mg  2-4 mg Buccal Q1H PRN         OLANZapine (zyPREXA) tablet 10 mg  10 mg Oral Q2H PRN   10 mg at 05/14/18 1324    Or     OLANZapine (zyPREXA) injection 10 mg  10 mg Intramuscular Q2H PRN         [START ON 5/16/2018] paliperidone (INVEGA SUSTENNA) injection SUSP 234 mg  234 mg Intramuscular Q28 Days         sulfamethoxazole-trimethoprim (BACTRIM DS/SEPTRA DS) 800-160 MG per tablet 1 tablet  1 tablet Oral BID   1 tablet at 05/14/18 0827     traZODone (DESYREL) tablet 50 mg  50 mg Oral At Bedtime PRN         No current Epic-ordered outpatient prescriptions on file.          10 point ROS reviewed- has large intact scabbed area to inner antecubital r/t IV drug use       Allergies:   No Known Allergies         Psychiatric Examination:   /87  Pulse 90  Temp 99.7  F (37.6  C) (Tympanic)  Resp 16  Ht 1.956 m (6' 5\")  Wt 119.5 kg (263 lb 7.2 oz)  SpO2 95%  BMI 31.24 kg/m2  Weight is 263 lbs 7.2 oz  Body mass index is 31.24 kg/(m^2).    Appearance: awake, alert, dressed in hospital scrubs, casually groomed  Attitude: guarded and dismissive though cooperative  Eye Contact: fair  Mood: less irritable than yesterday  Affect: blunted  Speech:  clear, coherent  Psychomotor Behavior:  no evidence of tardive dyskinesia, dystonia, or tics  Thought Process: linear, goal oriented  Associations:  no loose associations  Thought content: denies any suicidal or homicidal ideation denies hallucinations  Insight:  limited  Judgment:  limited  Oriented to:  time, person, and place  Attention Span and Concentration:  " limited  Recent and Remote Memory:  fair  Fund of Knowledge: appropriate for education  Muscle Strength and Tone: normal  Gait and Station: Normal           Labs:   No results found for this or any previous visit (from the past 24 hour(s)).           Plan:   Invega Sustenna  mg due 5/16/18   Continue antibiotic- right arm ulcer   Dressing change per wound care  Will discharge back to [facility] treatment when stable- is agreeable to stay voluntarily    ELOS: likely >5 days, going through withdrawal, need to stabilize prior to returning to treatment

## 2018-05-15 NOTE — PLAN OF CARE
Face to face end of shift report received from Shiloh NOGUEIRA RN. Rounding completed. Patient observed in the MHICU in his bed.     Nile Chappell  5/15/2018  9:36 AM

## 2018-05-15 NOTE — PLAN OF CARE
"Problem: Patient Care Overview  Goal: Individualization & Mutuality  Pt will be med compliant.  Pt will agree with treatment teams recommendations  Pt will have reality based conversations  Pt elopement precautions.  5/15/2018- Patient may wean to open unit if behavior appropriate. Must be compliant with safety searches and returning to MHICU.    Outcome: Improving  Patient did not participate in nursing assessment. He was short and abrupt with any answers. When administering his Bactrim, patient said, \"I don't know why they want me to take that.\" He was told it was due to his infection on his right arm. He said \"I don't need that.\" He took the med anyway. Patient laid in bed and made no requests. He was offered fluids. After treatment team patient was allowed to wean. He contracted with this writer for safe weaning and when to go back to his room. Patient stayed in the lounge and attended group. He maintained appropriate boundaries at all times. He asked to go back in the MHICU when he needed because he said he was tired.       "

## 2018-05-15 NOTE — PHARMACY
[facility]    Pharmacy      Antimicrobial Stewardship Note     Current antimicrobial therapy:  Anti-infectives (Future)    Start     Dose/Rate Route Frequency Ordered Stop    05/12/18 1315  sulfamethoxazole-trimethoprim (BACTRIM DS/SEPTRA DS) 800-160 MG per tablet 1 tablet      1 tablet Oral 2 TIMES DAILY 05/12/18 1303 05/18/18 2059          Indication: abscess, SSTI    Days of Therapy: 4     Pertinent labs:  Creatinine No results found for: CR  WBC No results found for: WBC  Procalcitonin No results found for: PCAL  CRP No results found for: CRP    Culture Results: none     Recommendations/Interventions:  1. Wound due to IV drug use, most likely.  Follow wound healing, if no progress, switch to an antibiotic with broader coverage.      Dipti Alas RP  May 15, 2018

## 2018-05-16 PROCEDURE — 25000132 ZZH RX MED GY IP 250 OP 250 PS 637: Mod: GY | Performed by: NURSE PRACTITIONER

## 2018-05-16 PROCEDURE — A9270 NON-COVERED ITEM OR SERVICE: HCPCS | Mod: GY | Performed by: NURSE PRACTITIONER

## 2018-05-16 PROCEDURE — 12400000 ZZH R&B MH

## 2018-05-16 PROCEDURE — 25000128 H RX IP 250 OP 636: Performed by: NURSE PRACTITIONER

## 2018-05-16 PROCEDURE — 99239 HOSP IP/OBS DSCHRG MGMT >30: CPT | Performed by: NURSE PRACTITIONER

## 2018-05-16 RX ORDER — CLONIDINE HYDROCHLORIDE 0.1 MG/1
0.1 TABLET ORAL 3 TIMES DAILY
Status: DISCONTINUED | OUTPATIENT
Start: 2018-05-16 | End: 2018-05-17 | Stop reason: HOSPADM

## 2018-05-16 RX ADMIN — CLONIDINE HYDROCHLORIDE 0.1 MG: 0.1 TABLET ORAL at 20:26

## 2018-05-16 RX ADMIN — ACETAMINOPHEN 650 MG: 325 TABLET ORAL at 13:58

## 2018-05-16 RX ADMIN — HYDROXYZINE HYDROCHLORIDE 50 MG: 25 TABLET ORAL at 13:58

## 2018-05-16 RX ADMIN — OLANZAPINE 10 MG: 10 TABLET, FILM COATED ORAL at 13:58

## 2018-05-16 RX ADMIN — PALIPERIDONE PALMITATE 234 MG: 234 INJECTION INTRAMUSCULAR at 10:47

## 2018-05-16 RX ADMIN — SULFAMETHOXAZOLE AND TRIMETHOPRIM 1 TABLET: 800; 160 TABLET ORAL at 08:45

## 2018-05-16 RX ADMIN — NICOTINE POLACRILEX 4 MG: 2 GUM, CHEWING ORAL at 15:00

## 2018-05-16 RX ADMIN — SULFAMETHOXAZOLE AND TRIMETHOPRIM 1 TABLET: 800; 160 TABLET ORAL at 20:26

## 2018-05-16 RX ADMIN — NICOTINE 1 PATCH: 21 PATCH, EXTENDED RELEASE TRANSDERMAL at 08:43

## 2018-05-16 RX ADMIN — CLONIDINE HYDROCHLORIDE 0.1 MG: 0.1 TABLET ORAL at 13:02

## 2018-05-16 ASSESSMENT — ACTIVITIES OF DAILY LIVING (ADL)
LAUNDRY: UNABLE TO COMPLETE
GROOMING: INDEPENDENT
DRESS: SCRUBS (BEHAVIORAL HEALTH);INDEPENDENT
ORAL_HYGIENE: INDEPENDENT

## 2018-05-16 NOTE — PHARMACY
[facility]    Pharmacy      Antimicrobial Stewardship Note     Current antimicrobial therapy:  Anti-infectives (Future)    Start     Dose/Rate Route Frequency Ordered Stop    05/12/18 1315  sulfamethoxazole-trimethoprim (BACTRIM DS/SEPTRA DS) 800-160 MG per tablet 1 tablet      1 tablet Oral 2 TIMES DAILY 05/12/18 1303 05/18/18 2059        Indication: Abscess, SSTI    Days of Therapy: 5     Pertinent labs:  Creatinine No results found for: CR  WBC No results found for: WBC  Procalcitonin No results found for: PCAL  CRP No results found for: CRP    Culture Results: none     Recommendations/Interventions:  1. Wound due to IV drug use, most likely.  Follow wound healing, if no progress, switch to an antibiotic with broader coverage.    Iris Casey, [facility]  May 16, 2018

## 2018-05-16 NOTE — PLAN OF CARE
Problem: Patient Care Overview  Goal: Individualization & Mutuality  Pt will be med compliant.  Pt will agree with treatment teams recommendations  Pt will have reality based conversations  Pt elopement precautions.    Outcome: Improving  Patient denied anxiety, depression, HI/SI and hallucinations. He was pleasant and denied needing any PRNs in the morning. Patient was moved to the open unit. He maintained appropriately boundaries and was nice to his peers. Patient was approached about signing voluntary rights. He became upset and asked if he had the right to leave. Patient was told he does but that he should talk to his probably officer first. Patient verbalized that he'd rather go to long-term than stay here. He asked for PRNs and was given 50mg Atarax and 10mg Zyprexa. Patient also said he was having generalized pain that he rated 5 of 10 and was given 650mg Tylenol. Patient was also given his JULIEN of Invega and willing took it. In fact he asked to have it. Patient is withdrawn and restless. He did not shower this shift and he dressing was not changed. Patient did agree to sign his voluntary rights.     Problem: Thought Process Alteration (Adult)  Goal: Improved Thought Process  Pt. Will have no hallucinations by target discharge date.  Pt. Will manage a reality based conversation.    Outcome: Improving  Progress made towards goals.

## 2018-05-16 NOTE — PLAN OF CARE
Problem: Patient Care Overview  Goal: Individualization & Mutuality  Pt will be med compliant.  Pt will agree with treatment teams recommendations  Pt will have reality based conversations  Pt elopement precautions.  5/15/2018- Patient may wean to open unit if behavior appropriate. Must be compliant with safety searches and returning to MHICU.    Outcome: No Change  Pt has been in bed with eyes closed and regular respirations observed all night. Will continue to monitor.

## 2018-05-16 NOTE — PLAN OF CARE
Problem: Patient Care Overview  Goal: Team Discussion  Team Plan:   BEHAVIORAL TEAM DISCUSSION    Participants: Laya Felix NP,Dario Farnsworth NP, Shannon Quesada NP,  Louise Calderon LSW,  Ailyn Grant LSW, Thi NGUYỄN.  Liliana Babcock OT.  Progress: fair, appropriate to wean. Denies SI and voices. C/O withdrawal symptoms. OK to move out to open unit.   Continued Stay Criteria/Rationale: Invega Sustenna  mg due 5/16/18. Continue antibiotic- right arm ulcer. Dressing change per wound care.   Medical/Physical: withdrawal, cellulitis  Precautions:   Behavioral Orders   Procedures     Code 1 - Restrict to Unit     Elopement precautions     Routine Programming     As clinically indicated     Status 15     Every 15 minutes.     Plan: possibly filing for committment  Rationale for change in precautions or plan: none

## 2018-05-16 NOTE — PROGRESS NOTES
[facility]  Psychiatric Progress Note      Impression:   This is a 32 year old yo male with a history of schizophrenia and polysubstance abuse.    Nima is lying in bed when I meet with him. He has poor to fair eye contact. Initially he was irritable but did become more cooperative and cordial throughout the conversation. He is requesting suboxone as he was cut off abruptly one month ago and feels he is still experiencing withdrawal. I did tell him I would schedule clonidine to help with this. He was in agreement. He denies any adverse effect from the invega. He reports he is eating and sleeping well. He denies any hallucinations, depression or suicidal thoughts. He does still agree to stay until an inpatient CD treatment facility has an available bed.     Educated regarding medication indications, risks, benefits, side effects, contraindications and possible interactions. Verbally expressed understanding.        DIagnoses:   Schizophrenia, paranoid type  R/o substance (amphetamine) induced psychosis  Stimulant (amphetamine) use disorder, severe  Opiate use disorder, severe      Attestation:  Patient has been seen and evaluated by me,  Laya Felix NP          Interim History:   The patient's care was discussed with the treatment team and chart notes were reviewed.          Medications:     Current Facility-Administered Medications Ordered in Epic   Medication Dose Route Frequency Last Rate Last Dose     acetaminophen (TYLENOL) tablet 650 mg  650 mg Oral Q4H PRN   650 mg at 05/12/18 1549     alum & mag hydroxide-simethicone (MYLANTA ES/MAALOX  ES) suspension 30 mL  30 mL Oral Q4H PRN         bisacodyl (DULCOLAX) Suppository 10 mg  10 mg Rectal Daily PRN         cloNIDine (CATAPRES) tablet 0.1 mg  0.1 mg Oral TID PRN   0.1 mg at 05/14/18 1324     hydrOXYzine (ATARAX) tablet 25-50 mg  25-50 mg Oral Q4H PRN   50 mg at 05/13/18 2037     magnesium hydroxide (MILK OF MAGNESIA) suspension 30 mL  30 mL  "Oral At Bedtime PRN         nicotine polacrilex (NICORETTE) gum 2-4 mg  2-4 mg Buccal Q1H PRN         OLANZapine (zyPREXA) tablet 10 mg  10 mg Oral Q2H PRN   10 mg at 05/14/18 1324    Or     OLANZapine (zyPREXA) injection 10 mg  10 mg Intramuscular Q2H PRN         [START ON 5/16/2018] paliperidone (INVEGA SUSTENNA) injection SUSP 234 mg  234 mg Intramuscular Q28 Days         sulfamethoxazole-trimethoprim (BACTRIM DS/SEPTRA DS) 800-160 MG per tablet 1 tablet  1 tablet Oral BID   1 tablet at 05/14/18 0827     traZODone (DESYREL) tablet 50 mg  50 mg Oral At Bedtime PRN         No current Epic-ordered outpatient prescriptions on file.          10 point ROS reviewed- has large intact scabbed area to inner antecubital r/t IV drug use       Allergies:   No Known Allergies         Psychiatric Examination:   /81  Pulse (!) 211  Temp 99  F (37.2  C) (Tympanic)  Resp 12  Ht 1.956 m (6' 5\")  Wt 119.5 kg (263 lb 7.2 oz)  SpO2 96%  BMI 31.24 kg/m2  Weight is 263 lbs 7.2 oz  Body mass index is 31.24 kg/(m^2).    Appearance: awake, alert, dressed in hospital scrubs, casually groomed  Attitude: guarded and dismissive but remains cooperative  Eye Contact: poor  Mood: slightly irritable initially  Affect: mood congruent  Speech:  clear, coherent  Psychomotor Behavior:  no evidence of tardive dyskinesia, dystonia, or tics  Thought Process: linear, goal oriented  Associations:  no loose associations  Thought content: denies any suicidal or homicidal ideation denies hallucinations  Insight:  limited  Judgment:  limited  Oriented to:  time, person, and place  Attention Span and Concentration:  limited  Recent and Remote Memory:  fair  Fund of Knowledge: appropriate for education  Muscle Strength and Tone: normal  Gait and Station: Normal           Labs:   No results found for this or any previous visit (from the past 24 hour(s)).           Plan:   Schedule clonidine 0.1 mg Three times a day  Dressing change per wound " care  Will discharge back to CD treatment when stable- is agreeable to stay voluntarily    ELOS: likely >5 days, going through withdrawal, need to stabilize prior to returning to treatment

## 2018-05-16 NOTE — PLAN OF CARE
Problem: Patient Care Overview  Goal: Discharge Needs Assessment  Outcome: No Change  Faxed PAOLA to [facility] for pt - left a message with Natalie to discuss pt returning to their program upon discharge.

## 2018-05-16 NOTE — PLAN OF CARE
Face to face end of shift report received from Nakul CORONADO RN. Rounding completed. Patient observed in the MHICU, he was allowed to wean and decided to eat breakfast in the open area.      Nile Chappell  5/16/2018  9:06 AM

## 2018-05-16 NOTE — DISCHARGE SUMMARY
"Psychiatric Discharge Summary    Nima Pandya MRN# [id number]   Age: 32 year old YOB: 1985     Date of Admission:  5/12/2018  Date of Discharge:  5/17/18  Admitting Physician:  Juan Jose Huang MD  Discharge Physician:  Laya Felix NP          Event Leading to Hospitalization and Hospital Stay   Nima Pandya is a 32 year old single  male who presented to the [facility] ED via law enforcement for psychosis. He had been discharged from [facility] 2 days earlier, on 5/10/18. Upon discharge at that time he was admitted to [facility] for CD treatment. Due to his erratic behavior at the treatment facility it was requested that [facility] evaluate him. Before they arrived he eloped. Father found him at his apartment and brought him to [facility]. He reportedly eloped from [facility] twice yesterday before police found him in the woods and brought him to the ED for further evaluation. He reported the use of methamphetamines, suboxone, and benzos earlier that day (5/11) to the .      Nima offers little information on assessment today and states \"I'd prefer not to talk about it. It's old news to me\". He was noted to have a large, scabbed over area on his right inner antecubital area that is currently being treated for cellulitis. This is secondary to continued IV drug use. He had initially refused antibiotics, stating \"this is mine and I have the right to have my body heal the way its wants to\". He did make several odd statements to nursing staff after admission here, noting that he needed to be left alone, \"quit pulling me back from heaven\". Unable to explain why he left treatment or [facility] It is unclear whether he has been taking his Invega Sustenna injection, will need to verify this on Monday.       Nima was switched to the long acting form of invega and was given a dose today, 5/16/18. He initially agreed to stay until a treatment bed was available but has " since rescinded that decision and is asking to discharge. Currently he is court ordered to attend treatment. He has been discharged from [facility] and has not been accepted anywhere else.              At time of discharge, there is no evidence that patient is in immediate danger of self or others.        DIagnoses:     Schizophrenia, paranoid type  R/o substance (amphetamine) induced psychosis  Stimulant (amphetamine) use disorder, severe  Opiate use disorder, severe         Labs:     Results for orders placed or performed during the hospital encounter of 05/12/18   Wound Ostomy Continence Nurse IP Consult    Narrative    Mandy Rueda, APRN CNS     5/14/2018 11:54 AM  SUBJECTIVE:  Nima Pandya, 32 year old, male presents with the following   Chief Complaint(s) with HPI to follow: Wound Care       HPI:  Wound care was asked to consult on a wound on Nima's right arm,   superior to the antecubital area.      Patient is cooperative with exam but not willing to discuss   history today, therefore information is gleaned from the medical   record and report from the nurses.  It is reported that the   injury is due to IV drug use.  The area was scabbed on admission,   but he picked the scab off on 5/13/2018 and requested a band aid   to cover it.  He was started on Bactrim DS in the ED.  He denies   pain at this time and is a little exasperated that I even want to   look at the wound today.    Patient Active Problem List   Diagnosis     Suicidal behavior       No past medical history on file.    No past surgical history on file.    No family history on file.    Social History   Substance Use Topics     Smoking status: Not on file     Smokeless tobacco: Not on file     Alcohol use Not on file       No current outpatient prescriptions on file.       No Known Allergies    REVIEW OF SYSTEMS  Skin: as above  Respiratory: No shortness of breath  Musculoskeletal: negative and injury to right arm, superior to   antecubital  area  Psychiatric: illegal drug usage    OBJECTIVE:    B/P: 135/68, T: 99.6, P: 70, R: 16, W: 263 lbs 7.2 oz, BMI: Body   mass index is 31.24 kg/(m^2).  Constitutional: alert and no distress  Respiratory:  Good diaphragmatic excursion.  Musculoskeletal: extremities normal- no gross deformities noted   and normal muscle tone  Skin:        Wound description:     Type of Wound:  ulcer   Location:  Right arm, superior to antecubital area    Drainage amount:  small   Drainage color:  red   Odor:  none   Wound bed:  red   Surrounding skin:  Edges attached        Measurements:  2.3 x 1.7 cm   Pain:  denies       Dressing change:      Wound dressed with oil emulsion gauze, covered with dry gauze,   affixed with medipore tape.      Psychiatric: mentation appears normal and irritated with cares    THERAPY GOAL:    Prevent infection   Moisture balance   Wound healing    ASSESSMENT / PLAN:    Comments:  We encouraged him to shower daily to clean the wound   and then have the dressing replaced.  He verbalized understanding   but staff report that he mostly lays in bed and has not been   interested in showering.    Plan:  Daily dressing change with oil emulsion gauze, covered   with dry gauze, affixed with medipore tape.  It is ok to remove   the dressing for shower daily, then apply new dressing.    Follow-up as needed for acute concerns.    Mandy Rueda CNS  Surgery and Wound Care  [place]                  Discharge Medications:     Current Discharge Medication List      CONTINUE these medications which have CHANGED    Details   paliperidone (INVEGA SUSTENNA) 234 MG/1.5ML SUSP Inject 1.5 mLs (234 mg) into the muscle every 28 days  Qty: 1 Syringe, Refills: 0    Associated Diagnoses: Schizoaffective disorder, bipolar type (H)         CONTINUE these medications which have NOT CHANGED    Details   sulfamethoxazole-trimethoprim (BACTRIM DS/SEPTRA DS) 800-160 MG per tablet Take 1 tablet by mouth 2 times daily         STOP  taking these medications       Paliperidone (INVEGA PO) Comments:   Reason for Stopping:                 Justification for dual anti-psychotic use: not applicable       Psychiatric Examination:   Appearance:  adequately groomed and dressed in hospital scrubs  Attitude:  guarded  Eye Contact:  fair  Mood:  better  Affect:  mood congruent  Speech:  clear, coherent  Psychomotor Behavior:  no evidence of tardive dyskinesia, dystonia, or tics  Thought Process:  linear and goal oriented  Associations:  no loose associations  Thought Content:  no evidence of suicidal ideation or homicidal ideation, no evidence of psychotic thought, no auditory hallucinations present and no visual hallucinations present  Insight:  fair  Judgment:  fair  Oriented to:  time, person, and place  Attention Span and Concentration:  intact  Recent and Remote Memory:  intact  Fund of Knowledge: low-normal  Muscle Strength and Tone: normal  Gait and Station: Normal  Perception: No perceptual disturbances       Discharge Plan:       Psychiatry Follow-up:      [facility]   - [place] - [phone]  [address] [address]  Phone- [phone]   Fax- [phone]        [facility]  - referral faxed on 5/15/2018  [place] - [phone]  [address] [place] [address] [address]  [place] [address]  Phone: [phone]  Fax: [phone] or [phone]     [facility]   - [place]   Phone: [phone]  Fax: [phone]    Attestation:  The patient has been seen and evaluated by me,  Laya Felix NP         Discharge Services Provided:    60 minutes spent on discharge services, including:  Final examination of patient.  Review and discussion of Hospital stay.  Instructions for continued outpatient care/goals.  Preparation of discharge records.  Preparation of medications refills and new prescriptions.  Preparation of Applicable referral forms.

## 2018-05-16 NOTE — PLAN OF CARE
Face to face end of shift report received from Charissa DE LAP AZ RN. Rounding completed. Patient observed.     Nakul Grant  5/15/2018  11:44 PM

## 2018-05-16 NOTE — PLAN OF CARE
Problem: Patient Care Overview  Goal: Individualization & Mutuality  Pt will be med compliant.  Pt will agree with treatment teams recommendations  Pt will have reality based conversations  Pt is on elopement precautions.  Daily dressing change to R arm.      Outcome: No Change  Pt denies all mental health criteria. His affect is blunted and flat. He does not attend groups and spends minimal time in the lounge. Pt up to nurses station at beginning of shift asking if he can DC to [facility]. Pt was informed that he could sign a 12 hr intent to leave but was encouraged to stay voluntarily until a plan was in place. Pt decided to sign 12 hr intent to leave and did so at 1625. NP Laya Felix was updated. Per NP, pt needs to stay until 0425. Pt contacting his  and PO at this time.     - Pt's DOUGLAS Simons with [facility] called, wondering if pt is discharging. She was updated that pt's 72hr hold had  and that pt wanted to leave and had signed a 12 hr intent to leave. She expresses frustration but was understanding that there was no criteria to hold pt. CM states that pt's PO had left her a message because pt had contacted PO also. CM states that she feels it is not a good plan for pt to DC back to [facility] until CD treatment bed is available because he can only stay there for 10 days.     1800- Pt up to nurses station, states that he plans on taking a taxi when he discharges. Pt has no sapp, states that he would have to use an MICAH to get cash. He was informed that there is an MICAH downstairs.     - Pt took scheduled HS meds with no issue. He initially refused VS but then agreed to let them be taken when he was told that he could not receive scheduled clonidine. Pt declines dressing change, states that the dressing was changed this AM though it was not per charting. Pt currently in bed. States that he will call the taxi service to set up a ride.    - Pt states that he plans on staying until  breakfast tomorrow.     Problem: Thought Process Alteration (Adult)  Goal: Improved Thought Process  Pt. Will have no hallucinations by target discharge date.  Pt. Will manage a reality based conversation.    Outcome: No Change  Pt's conversations have been reality based. Pt appears preoccupied at times.

## 2018-05-16 NOTE — PLAN OF CARE
Face to face end of shift report received from DELMA Dowd. Rounding completed. [facility].  Denies immediate needs.     Ijeoma Mello  5/16/2018  3:43 PM

## 2018-05-17 VITALS
TEMPERATURE: 97.5 F | OXYGEN SATURATION: 95 % | HEART RATE: 87 BPM | BODY MASS INDEX: 31.11 KG/M2 | RESPIRATION RATE: 14 BRPM | HEIGHT: 77 IN | DIASTOLIC BLOOD PRESSURE: 57 MMHG | WEIGHT: 263.45 LBS | SYSTOLIC BLOOD PRESSURE: 131 MMHG

## 2018-05-17 NOTE — PHARMACY
Pt received an Invega Sustenna 234mg injection on 5/16/18. This dose has been credited as an Invega Sustenna 234mg replacement dose was received through the [facility] offered by [employer].  Mariluz Baird, PharmD  May 17, 2018

## 2018-05-17 NOTE — DISCHARGE INSTRUCTIONS
Behavioral Discharge Planning and Instructions    Summary: Nima was admitted to  with increased mental health symptoms     Main Diagnosis: Schizophrenia, paranoid type, R/o substance (amphetamine) induced psychosis, Stimulant (amphetamine) use disorder, severe, Opiate use disorder, severe    Major Treatments, Procedures and Findings: Stabilize with medications, connect with community programs.    Symptoms to Report: feeling more aggressive, increased confusion, losing more sleep, mood getting worse or thoughts of suicide    Lifestyle Adjustment: Take all medications as prescribed, meet with doctor/ medication provider, out patient therapist, , and [facility] worker as scheduled. Abstain from alcohol or any unprescribed drugs.      Psychiatry Follow-up:     [facility]   - [place] - [phone]  [address] [address]  Phone- [phone]     Fax- [phone]       [facility]  - referral faxed on 5/15/2018  [place] - [phone]  [address]  [place] [address]  Phone: [phone]  Fax: [phone] or [phone]    [facility]   Jackie Shoemaker   Phone: [phone]  Fax: [phone]      Resources:   Crisis Intervention: 279.689.6349 or 781-574-1063 (TTY: 205.801.1216).  Call anytime for help.  National [place] on Mental Illness (www.[place].justina.org): 157.248.6512 or 633-171-2405.  Alcoholics Anonymous (www.alcoholics-anonymous.org): Check your phone book for your local chapter.  Suicide Awareness Voices of Education (SAVE) (www.save.org): 320-829-JQRS (9714)  National Suicide Prevention Line (www.mentalhealthmn.org): 613-641-BSZM (1973)  Mental Health Consumer/Survivor Network of [place] (www.mhcsn.net): 541.450.9277 or 511-093-4136  Mental Health Association of [place] (www.mentalhealth.org): 688.470.1100 or 991-710-5851    General Medication Instructions:   See your medication sheet(s) for instructions.    Take all medicines as directed.  Make no changes unless your doctor suggests them.   Go to all your doctor visits.  Be sure to have all your required lab tests. This way, your medicines can be refilled on time.  Do not use any drugs not prescribed by your doctor.  Avoid alcohol.    [place] Area:  [facility], Crisis stabilization [facility]- [phone]  [facility] Crisis Line: 1-970.575.1995  Advocates For Family Peace: 469-4664  Sexual Assault Program [facility]: 442.798.6136 or 1-447.699.1116  [place] Battered Women's Program: 1-624.320.2106 Ext: 279       Calls answered Mon-Fri-8:00 am--4:30 pm    [place]:  Advocates for Family Peace: 1-432.627.6113  [facility] first call for help: 4-821-165-2593  [facility] Crisis Center:  (913) 857-9255      [place] Area:  Warm Line: 1-623.132.4519       Calls answered Tuesday--Saturday 4:00 pm--10:00 pm  [facility] Crisis Line - 132.545.7340  [facility] 183-424-1205    [place] Statewide:  [place] Crisis and Referral Services: 1-359.849.7021  National Suicide Prevention Lifeline: 2-070-832-TALK (7749)   - ytf8fton- Text  Life  to 88842  First Call for Help: 2-1-1  JAYANT Helpline- 3-339-ELJF-HELP

## 2018-05-17 NOTE — PLAN OF CARE
Received telephone call from Dian Brenner stating that patient had jumped out of the taxi in the parking lot and had stated he wanted to get away from the hospital.

## 2018-05-17 NOTE — PLAN OF CARE
Spoke with  this morning and she stated pt called her yesterday evening around 16:30 and informed her he wanted to leave and go to [facility] prior to going back to tx  told pt that was a bad plan and she did not agree and encouraged pt to stay here. Pt also called  who was under the impression pt would stay inpatient until tx - PO spoke with pt dad as well. All encouraged pt to stay inpatient and not to discharge.

## 2018-05-17 NOTE — PLAN OF CARE
Face to face end of shift report received from Ijeoma NOGUEIRA RN. Rounding completed. Patient observed.     Nakul Grant  5/16/2018  11:39 PM

## 2018-05-17 NOTE — PLAN OF CARE
Problem: Patient Care Overview  Goal: Individualization & Mutuality  Pt will be med compliant.  Pt will agree with treatment teams recommendations  Pt will have reality based conversations  Pt is on elopement precautions.  Daily dressing change to R arm.       Outcome: Adequate for Discharge Date Met: 05/17/18  Discharge Note    Patient Discharged to home on 5/17/2018 7:30 AM via Taxi..     Patient informed of discharge instructions in AVS by night shift nurse Parul LYNN . patient verbalizes understanding and denies having any questions pertaining to AVS. Patient stable at time of discharge. Patient denies SI, HI, and thoughts of self harm at time of discharge. All personal belongings returned to patient. Discharge prescriptions sent to [facility] via electronic communication. Psych evaluation, history and physical, AVS, and discharge summary faxed to next level of care- by ..     Sonja Dejesus  5/17/2018  7:30 AM

## 2018-05-17 NOTE — PLAN OF CARE
Problem: Patient Care Overview  Goal: Individualization & Mutuality  Pt will be med compliant.  Pt will agree with treatment teams recommendations  Pt will have reality based conversations  Pt is on elopement precautions.  Daily dressing change to R arm.       Outcome: No Change  Pt has been in bed with eyes closed and regular respirations observed all night. Will continue to monitor.

## 2019-02-20 NOTE — PROGRESS NOTES
Anesthesia PreOp Note    HPI:     Kirstie Menezes is a 77year Please see inpatient consult note.    Mandy Rueda CNS     

## 2020-05-24 NOTE — PLAN OF CARE
Problem: Patient Care Overview  Goal: Individualization & Mutuality  Pt will be med compliant.  Pt will agree with treatment teams recommendations  Pt will have reality based conversations  Pt elopement precautions.  Pt will not wean today; To be readdressed daily.   Outcome: No Change  Pt has been in bed with eyes closed and regular respirations for total of 3 hours. 15 minute and PRN checks all night. No complaints offered. Will continue to monitor.      Shiloh Soliman  5/15/2018  5:14 AM         stated

## 2024-01-08 ENCOUNTER — LAB (OUTPATIENT)
Dept: LAB | Facility: CLINIC | Age: 39
End: 2024-01-08
Payer: MEDICARE

## 2024-01-08 DIAGNOSIS — Z79.899 ENCOUNTER FOR MONITORING ANTIHYPERTENSIVE USE: Primary | ICD-10-CM

## 2024-01-08 DIAGNOSIS — Z51.81 ENCOUNTER FOR MONITORING ANTIHYPERTENSIVE USE: Primary | ICD-10-CM

## 2024-01-08 LAB
ALBUMIN SERPL BCG-MCNC: 4.3 G/DL (ref 3.5–5.2)
ALP SERPL-CCNC: 129 U/L (ref 40–150)
ALT SERPL W P-5'-P-CCNC: 54 U/L (ref 0–70)
AMPHETAMINES UR QL SCN: ABNORMAL
ANION GAP SERPL CALCULATED.3IONS-SCNC: 7 MMOL/L (ref 7–15)
ANION GAP SERPL CALCULATED.3IONS-SCNC: 7 MMOL/L (ref 7–15)
AST SERPL W P-5'-P-CCNC: 30 U/L (ref 0–45)
BARBITURATES UR QL SCN: ABNORMAL
BASOPHILS # BLD AUTO: 0 10E3/UL (ref 0–0.2)
BASOPHILS NFR BLD AUTO: 0 %
BENZODIAZ UR QL SCN: ABNORMAL
BILIRUB DIRECT SERPL-MCNC: <0.2 MG/DL (ref 0–0.3)
BILIRUB SERPL-MCNC: 0.3 MG/DL
BUN SERPL-MCNC: 9.4 MG/DL (ref 6–20)
BZE UR QL SCN: ABNORMAL
CALCIUM SERPL-MCNC: 9.5 MG/DL (ref 8.6–10)
CANNABINOIDS UR QL SCN: ABNORMAL
CHLORIDE SERPL-SCNC: 105 MMOL/L (ref 98–107)
CHLORIDE SERPL-SCNC: 105 MMOL/L (ref 98–107)
CK SERPL-CCNC: 71 U/L (ref 39–308)
CREAT SERPL-MCNC: 0.77 MG/DL (ref 0.67–1.17)
DEPRECATED HCO3 PLAS-SCNC: 27 MMOL/L (ref 22–29)
DEPRECATED HCO3 PLAS-SCNC: 27 MMOL/L (ref 22–29)
EGFRCR SERPLBLD CKD-EPI 2021: >90 ML/MIN/1.73M2
EOSINOPHIL # BLD AUTO: 0.1 10E3/UL (ref 0–0.7)
EOSINOPHIL NFR BLD AUTO: 3 %
ERYTHROCYTE [DISTWIDTH] IN BLOOD BY AUTOMATED COUNT: 13.6 % (ref 10–15)
FASTING STATUS PATIENT QL REPORTED: NO
FENTANYL UR QL: ABNORMAL
GLUCOSE SERPL-MCNC: 155 MG/DL (ref 70–99)
GLUCOSE SERPL-MCNC: 155 MG/DL (ref 70–99)
HBA1C MFR BLD: 5.7 %
HCT VFR BLD AUTO: 44 % (ref 40–53)
HGB BLD-MCNC: 14.3 G/DL (ref 13.3–17.7)
IMM GRANULOCYTES # BLD: 0 10E3/UL
IMM GRANULOCYTES NFR BLD: 0 %
LYMPHOCYTES # BLD AUTO: 2.1 10E3/UL (ref 0.8–5.3)
LYMPHOCYTES NFR BLD AUTO: 40 %
MCH RBC QN AUTO: 28.2 PG (ref 26.5–33)
MCHC RBC AUTO-ENTMCNC: 32.5 G/DL (ref 31.5–36.5)
MCV RBC AUTO: 87 FL (ref 78–100)
MONOCYTES # BLD AUTO: 0.4 10E3/UL (ref 0–1.3)
MONOCYTES NFR BLD AUTO: 7 %
NEUTROPHILS # BLD AUTO: 2.6 10E3/UL (ref 1.6–8.3)
NEUTROPHILS NFR BLD AUTO: 50 %
NRBC # BLD AUTO: 0 10E3/UL
NRBC BLD AUTO-RTO: 0 /100
OPIATES UR QL SCN: ABNORMAL
PCP QUAL URINE (ROCHE): ABNORMAL
PLATELET # BLD AUTO: 272 10E3/UL (ref 150–450)
POTASSIUM SERPL-SCNC: 4.3 MMOL/L (ref 3.4–5.3)
POTASSIUM SERPL-SCNC: 4.3 MMOL/L (ref 3.4–5.3)
PROT SERPL-MCNC: 7.4 G/DL (ref 6.4–8.3)
RBC # BLD AUTO: 5.07 10E6/UL (ref 4.4–5.9)
SODIUM SERPL-SCNC: 139 MMOL/L (ref 135–145)
SODIUM SERPL-SCNC: 139 MMOL/L (ref 135–145)
TSH SERPL DL<=0.005 MIU/L-ACNC: 3.19 UIU/ML (ref 0.3–4.2)
WBC # BLD AUTO: 5.3 10E3/UL (ref 4–11)

## 2024-01-08 PROCEDURE — 80307 DRUG TEST PRSMV CHEM ANLYZR: CPT

## 2024-01-08 PROCEDURE — 82248 BILIRUBIN DIRECT: CPT

## 2024-01-08 PROCEDURE — 84460 ALANINE AMINO (ALT) (SGPT): CPT

## 2024-01-08 PROCEDURE — 82550 ASSAY OF CK (CPK): CPT

## 2024-01-08 PROCEDURE — 84443 ASSAY THYROID STIM HORMONE: CPT

## 2024-01-08 PROCEDURE — 83036 HEMOGLOBIN GLYCOSYLATED A1C: CPT

## 2024-01-08 PROCEDURE — 36415 COLL VENOUS BLD VENIPUNCTURE: CPT

## 2024-01-08 PROCEDURE — 82374 ASSAY BLOOD CARBON DIOXIDE: CPT

## 2024-01-08 PROCEDURE — 85025 COMPLETE CBC W/AUTO DIFF WBC: CPT

## 2024-01-22 ENCOUNTER — LAB (OUTPATIENT)
Dept: LAB | Facility: CLINIC | Age: 39
End: 2024-01-22
Payer: MEDICARE

## 2024-01-22 DIAGNOSIS — F15.20 METHAMPHETAMINE USE DISORDER, MODERATE (H): Primary | ICD-10-CM

## 2024-01-22 DIAGNOSIS — F11.20 OPIOID DEPENDENCE (H): ICD-10-CM

## 2024-01-22 DIAGNOSIS — F20.0 PARANOID SCHIZOPHRENIA, SUBCHRONIC CONDITION WITH ACUTE EXACERBATION (H): ICD-10-CM

## 2024-01-22 DIAGNOSIS — B18.2 HEP C W/O COMA, CHRONIC (H): ICD-10-CM

## 2024-01-22 DIAGNOSIS — F20.0 PARANOID SCHIZOPHRENIA, SUBCHRONIC CONDITION (H): ICD-10-CM

## 2024-01-22 LAB
AMPHETAMINES UR QL SCN: ABNORMAL
BARBITURATES UR QL SCN: ABNORMAL
BENZODIAZ UR QL SCN: ABNORMAL
BZE UR QL SCN: ABNORMAL
CANNABINOIDS UR QL SCN: ABNORMAL
FENTANYL UR QL: ABNORMAL
HIV 1+2 AB+HIV1 P24 AG SERPL QL IA: NONREACTIVE
OPIATES UR QL SCN: ABNORMAL
PCP QUAL URINE (ROCHE): ABNORMAL

## 2024-01-22 PROCEDURE — 86481 TB AG RESPONSE T-CELL SUSP: CPT

## 2024-01-22 PROCEDURE — 36415 COLL VENOUS BLD VENIPUNCTURE: CPT

## 2024-01-22 PROCEDURE — 87389 HIV-1 AG W/HIV-1&-2 AB AG IA: CPT

## 2024-01-22 PROCEDURE — 80307 DRUG TEST PRSMV CHEM ANLYZR: CPT

## 2024-01-22 PROCEDURE — 86803 HEPATITIS C AB TEST: CPT

## 2024-01-22 PROCEDURE — 87522 HEPATITIS C REVRS TRNSCRPJ: CPT

## 2024-01-23 LAB
HCV AB SERPL QL IA: REACTIVE
HCV RNA SERPL NAA+PROBE-ACNC: NOT DETECTED IU/ML

## 2024-01-24 LAB
GAMMA INTERFERON BACKGROUND BLD IA-ACNC: 0.01 IU/ML
M TB IFN-G BLD-IMP: NEGATIVE
M TB IFN-G CD4+ BCKGRND COR BLD-ACNC: 9.99 IU/ML
MITOGEN IGNF BCKGRD COR BLD-ACNC: 0 IU/ML
MITOGEN IGNF BCKGRD COR BLD-ACNC: 0 IU/ML
QUANTIFERON MITOGEN: 10 IU/ML
QUANTIFERON NIL TUBE: 0.01 IU/ML
QUANTIFERON TB1 TUBE: 0.01 IU/ML
QUANTIFERON TB2 TUBE: 0.01

## 2024-02-18 ENCOUNTER — HEALTH MAINTENANCE LETTER (OUTPATIENT)
Age: 39
End: 2024-02-18

## 2024-03-14 ENCOUNTER — OFFICE VISIT (OUTPATIENT)
Dept: INTERNAL MEDICINE | Facility: CLINIC | Age: 39
End: 2024-03-14
Payer: MEDICARE

## 2024-03-14 VITALS
BODY MASS INDEX: 37.96 KG/M2 | TEMPERATURE: 98.5 F | WEIGHT: 315 LBS | SYSTOLIC BLOOD PRESSURE: 106 MMHG | DIASTOLIC BLOOD PRESSURE: 70 MMHG | HEART RATE: 81 BPM | OXYGEN SATURATION: 94 %

## 2024-03-14 DIAGNOSIS — F20.0 PARANOID SCHIZOPHRENIA (H): Primary | ICD-10-CM

## 2024-03-14 DIAGNOSIS — E66.812 CLASS 2 SEVERE OBESITY DUE TO EXCESS CALORIES WITH SERIOUS COMORBIDITY AND BODY MASS INDEX (BMI) OF 37.0 TO 37.9 IN ADULT (H): ICD-10-CM

## 2024-03-14 DIAGNOSIS — E66.01 CLASS 2 SEVERE OBESITY DUE TO EXCESS CALORIES WITH SERIOUS COMORBIDITY AND BODY MASS INDEX (BMI) OF 37.0 TO 37.9 IN ADULT (H): ICD-10-CM

## 2024-03-14 DIAGNOSIS — R73.03 PREDIABETES: ICD-10-CM

## 2024-03-14 DIAGNOSIS — F19.10 POLYSUBSTANCE ABUSE (H): ICD-10-CM

## 2024-03-14 PROBLEM — Z86.14 PERSONAL HISTORY OF METHICILLIN RESISTANT STAPHYLOCOCCUS AUREUS INFECTION: Status: ACTIVE | Noted: 2023-06-21

## 2024-03-14 PROCEDURE — 99204 OFFICE O/P NEW MOD 45 MIN: CPT | Performed by: INTERNAL MEDICINE

## 2024-03-14 RX ORDER — MULTIVITAMIN
1 TABLET ORAL DAILY
COMMUNITY
End: 2024-03-14

## 2024-03-14 RX ORDER — BUPRENORPHINE AND NALOXONE 4; 1 MG/1; MG/1
FILM, SOLUBLE BUCCAL; SUBLINGUAL
COMMUNITY
Start: 2023-08-04 | End: 2024-03-14

## 2024-03-14 RX ORDER — CLONAZEPAM 0.5 MG/1
0.5 TABLET ORAL
COMMUNITY
Start: 2023-02-06

## 2024-03-14 RX ORDER — TOPIRAMATE 100 MG/1
100 TABLET, FILM COATED ORAL 2 TIMES DAILY
COMMUNITY
End: 2024-03-14

## 2024-03-14 RX ORDER — TRAZODONE HYDROCHLORIDE 100 MG/1
100 TABLET ORAL
COMMUNITY
Start: 2023-03-25

## 2024-03-14 RX ORDER — FAMOTIDINE 20 MG/1
TABLET, FILM COATED ORAL
COMMUNITY
End: 2024-03-14

## 2024-03-14 RX ORDER — LANOLIN ALCOHOL/MO/W.PET/CERES
3 CREAM (GRAM) TOPICAL
COMMUNITY
Start: 2023-10-20 | End: 2024-03-14

## 2024-03-14 RX ORDER — TRAZODONE HYDROCHLORIDE 150 MG/1
1 TABLET ORAL AT BEDTIME
COMMUNITY
Start: 2022-07-18 | End: 2024-03-14

## 2024-03-14 RX ORDER — BUPRENORPHINE AND NALOXONE 12; 3 MG/1; MG/1
1 FILM, SOLUBLE BUCCAL; SUBLINGUAL
COMMUNITY
Start: 2023-05-22

## 2024-03-14 RX ORDER — NAPROXEN 500 MG/1
TABLET ORAL
COMMUNITY
Start: 2023-07-07 | End: 2024-03-14

## 2024-03-14 RX ORDER — ALBUTEROL SULFATE 90 UG/1
1-2 AEROSOL, METERED RESPIRATORY (INHALATION)
COMMUNITY
Start: 2023-07-27 | End: 2024-03-14

## 2024-03-14 RX ORDER — CLONAZEPAM 0.25 MG/1
TABLET, ORALLY DISINTEGRATING ORAL
COMMUNITY
Start: 2023-07-07 | End: 2024-03-14

## 2024-03-14 RX ORDER — GABAPENTIN 300 MG/1
600 CAPSULE ORAL 3 TIMES DAILY
COMMUNITY
Start: 2023-08-03 | End: 2024-03-14

## 2024-03-14 RX ORDER — OLANZAPINE 20 MG/1
1 TABLET ORAL AT BEDTIME
COMMUNITY
Start: 2023-10-20

## 2024-03-14 RX ORDER — LOPERAMIDE HCL 2 MG
CAPSULE ORAL
COMMUNITY
Start: 2023-05-18 | End: 2024-03-14

## 2024-03-14 RX ORDER — BUPRENORPHINE AND NALOXONE 8; 2 MG/1; MG/1
FILM, SOLUBLE BUCCAL; SUBLINGUAL
COMMUNITY
Start: 2023-10-20 | End: 2024-03-14

## 2024-03-14 RX ORDER — LORATADINE 10 MG/1
10 TABLET ORAL
COMMUNITY
End: 2024-03-14

## 2024-03-14 RX ORDER — HYDROXYZINE HYDROCHLORIDE 50 MG/1
50 TABLET, FILM COATED ORAL
COMMUNITY
Start: 2023-10-20 | End: 2024-03-14

## 2024-03-14 RX ORDER — NICOTINE 21 MG/24HR
1 PATCH, TRANSDERMAL 24 HOURS TRANSDERMAL
COMMUNITY
Start: 2023-01-29 | End: 2024-03-14

## 2024-03-14 RX ORDER — HYDROCHLOROTHIAZIDE 25 MG/1
25 TABLET ORAL EVERY MORNING
COMMUNITY
End: 2024-03-14

## 2024-03-14 RX ORDER — METFORMIN HCL 500 MG
TABLET, EXTENDED RELEASE 24 HR ORAL
COMMUNITY
End: 2024-03-14

## 2024-03-14 RX ORDER — DIPHENHYDRAMINE HYDROCHLORIDE 25 MG/1
CAPSULE ORAL
COMMUNITY
Start: 2023-04-02 | End: 2024-03-14

## 2024-03-14 RX ORDER — BUPRENORPHINE HYDROCHLORIDE AND NALOXONE HYDROCHLORIDE DIHYDRATE 8; 2 MG/1; MG/1
TABLET SUBLINGUAL
COMMUNITY
Start: 2023-07-07 | End: 2024-03-14

## 2024-03-14 RX ORDER — BUPROPION HYDROCHLORIDE 150 MG/1
150 TABLET ORAL
COMMUNITY
Start: 2023-10-20

## 2024-03-14 RX ORDER — CYCLOBENZAPRINE HCL 5 MG
1 TABLET ORAL
COMMUNITY
Start: 2022-11-03 | End: 2024-03-14

## 2024-03-14 RX ORDER — LURASIDONE HYDROCHLORIDE 40 MG/1
TABLET, FILM COATED ORAL
COMMUNITY
End: 2024-03-14

## 2024-03-14 RX ORDER — GABAPENTIN 400 MG/1
CAPSULE ORAL
COMMUNITY
Start: 2024-01-06 | End: 2024-03-14

## 2024-03-14 NOTE — PROGRESS NOTES
Assessment & Plan   Paranoid schizophrenia (H)  Polysubstance abuse (H)  Referral placed for our mental health department. Encouraged him to continue current cares/meds/plan with his current psychiatrist until he can establish with one locally.  - Adult Mental Health  Referral; Future    Prediabetes  Seen on recent lab work.    Class 2 severe obesity due to excess calories with serious comorbidity and body mass index (BMI) of 37.0 to 37.9 in adult (H)  BMI 37.9. Weight loss would help with prediabetes.    Signed Electronically by:  Yimi Patel MD, MPH  [facility]  Internal Medicine    Subjective   Nima is a 38 year old who presents to discuss establishing care and to request a referral to psychiatry. This is the first time I have met Nima, who has not been seen at our clinic before. He tells me he recently moved to the [facility] from [place]. He has been under the care of psychiatrists in [place] for a bit now, and would like to get a referral for a psychiatrist locally. He has no acute concerns today. We reviewed his recent blood work as ordered by an outside provider.        Objective    /70   Pulse 81   Temp 98.5  F (36.9  C) (Temporal)   Wt 145.2 kg (320 lb 1.6 oz)   SpO2 94%   BMI 37.96 kg/m    Body mass index is 37.96 kg/m .    Physical Exam   GENERAL: alert and in no distress.  EYES: conjunctivae/corneas clear. EOMs grossly intact  HENT: Facies symmetric.  RESP: No iWOB.  MSK: Moves all four extremities freely  SKIN: No significant ulcers, lesions, or rashes on the visualized portions of the skin  NEURO: CN II-XII grossly intact.

## 2024-05-24 ENCOUNTER — ANCILLARY PROCEDURE (OUTPATIENT)
Dept: GENERAL RADIOLOGY | Facility: CLINIC | Age: 39
End: 2024-05-24
Attending: INTERNAL MEDICINE
Payer: MEDICARE

## 2024-05-24 ENCOUNTER — OFFICE VISIT (OUTPATIENT)
Dept: INTERNAL MEDICINE | Facility: CLINIC | Age: 39
End: 2024-05-24
Payer: MEDICARE

## 2024-05-24 VITALS
BODY MASS INDEX: 34.87 KG/M2 | TEMPERATURE: 97.6 F | HEIGHT: 77 IN | HEART RATE: 88 BPM | OXYGEN SATURATION: 99 % | SYSTOLIC BLOOD PRESSURE: 137 MMHG | DIASTOLIC BLOOD PRESSURE: 89 MMHG | WEIGHT: 295.3 LBS

## 2024-05-24 DIAGNOSIS — Z13.9 ENCOUNTER FOR SCREENING INVOLVING SOCIAL DETERMINANTS OF HEALTH (SDOH): ICD-10-CM

## 2024-05-24 DIAGNOSIS — Z00.00 ENCOUNTER FOR MEDICARE ANNUAL WELLNESS EXAM: Primary | ICD-10-CM

## 2024-05-24 DIAGNOSIS — M79.675 PAIN OF TOE OF LEFT FOOT: ICD-10-CM

## 2024-05-24 DIAGNOSIS — F20.0 PARANOID SCHIZOPHRENIA (H): ICD-10-CM

## 2024-05-24 DIAGNOSIS — R73.03 PREDIABETES: ICD-10-CM

## 2024-05-24 DIAGNOSIS — F19.10 POLYSUBSTANCE ABUSE (H): ICD-10-CM

## 2024-05-24 DIAGNOSIS — F17.200 TOBACCO DEPENDENCY: ICD-10-CM

## 2024-05-24 DIAGNOSIS — Z86.19 HISTORY OF HEPATITIS C VIRUS INFECTION: ICD-10-CM

## 2024-05-24 PROCEDURE — 90677 PCV20 VACCINE IM: CPT | Performed by: INTERNAL MEDICINE

## 2024-05-24 PROCEDURE — 73660 X-RAY EXAM OF TOE(S): CPT | Mod: TC | Performed by: RADIOLOGY

## 2024-05-24 PROCEDURE — 91320 SARSCV2 VAC 30MCG TRS-SUC IM: CPT | Performed by: INTERNAL MEDICINE

## 2024-05-24 PROCEDURE — 90480 ADMN SARSCOV2 VAC 1/ONLY CMP: CPT | Performed by: INTERNAL MEDICINE

## 2024-05-24 PROCEDURE — G0439 PPPS, SUBSEQ VISIT: HCPCS | Performed by: INTERNAL MEDICINE

## 2024-05-24 PROCEDURE — 99213 OFFICE O/P EST LOW 20 MIN: CPT | Mod: 25 | Performed by: INTERNAL MEDICINE

## 2024-05-24 PROCEDURE — G0009 ADMIN PNEUMOCOCCAL VACCINE: HCPCS | Performed by: INTERNAL MEDICINE

## 2024-05-24 RX ORDER — HYDROXYZINE HYDROCHLORIDE 50 MG/1
50 TABLET, FILM COATED ORAL EVERY 8 HOURS PRN
COMMUNITY
Start: 2024-05-02

## 2024-05-24 RX ORDER — GABAPENTIN 600 MG/1
600 TABLET ORAL 3 TIMES DAILY
COMMUNITY
Start: 2024-04-29

## 2024-05-24 SDOH — HEALTH STABILITY: PHYSICAL HEALTH: ON AVERAGE, HOW MANY DAYS PER WEEK DO YOU ENGAGE IN MODERATE TO STRENUOUS EXERCISE (LIKE A BRISK WALK)?: 6 DAYS

## 2024-05-24 ASSESSMENT — SOCIAL DETERMINANTS OF HEALTH (SDOH): HOW OFTEN DO YOU GET TOGETHER WITH FRIENDS OR RELATIVES?: NEVER

## 2024-05-24 NOTE — PROGRESS NOTES
"Preventive Care Visit  [facility]  Tevin Tadeo MD, Internal Medicine  May 24, 2024      Assessment & Plan     Encounter for screening involving social determinants of health (SDoH)  Encounter for Medicare annual wellness exam  Up-to-date with labs, printout given  to caregiver per request      Pain of toe of left foot  Old fracture history  X-ray ordered today    Prediabetes  Diet and exercise      Paranoid schizophrenia (H)  Follow-up with psychiatry    Tobacco dependency  Polysubstance abuse (H)  Stable    History of hepatitis C virus infection  Treated      Patient has been advised of split billing requirements and indicates understanding: Yes        BMI  Estimated body mass index is 35.02 kg/m  as calculated from the following:    Height as of this encounter: 1.956 m (6' 5\").    Weight as of this encounter: 133.9 kg (295 lb 4.8 oz).   Weight management plan: Discussed healthy diet and exercise guidelines    Counseling  Appropriate preventive services were discussed with this patient, including applicable screening as appropriate for fall prevention, nutrition, physical activity, Tobacco-use cessation, weight loss and cognition.  Checklist reviewing preventive services available has been given to the patient.  Reviewed patient's diet, addressing concerns and/or questions.   Patient is at risk for social isolation and has been provided with information about the benefit of social connection.   The patient was instructed to see the dentist every 6 months.   The patient was provided with written information regarding signs of hearing loss.   I have reviewed Opioid Use Disorder and Substance Use Disorder risk factors and made any needed referrals.       Follow up in 6 months.      Shantell Herring is a 38 year old, presenting for the following:  Wellness Visit      Health Care Directive  Patient does not have a Health Care Directive or Living Will: Discussed advance care planning with " patient; however, patient declined at this time.    HPI    Patient presents to the clinic with a caregiver from his group home.    Patient states his parents are in [place].    Has Schizophrenia and sees a Psychiatrist Dr. Eryn Ramírez.  Has an appointment next Friday.  Currently appears stable on meds    History of hep C treated 8 years ago, recent hep C viral load negative.    History of opioid abuse and polysubstance abuse, currently on Suboxone.  This is also going to be prescribed by his psychiatrist.            5/24/2024   General Health   How would you rate your overall physical health? Good   Feel stress (tense, anxious, or unable to sleep) Not at all         5/24/2024   Nutrition   Diet: Regular (no restrictions)         5/24/2024   Exercise   Days per week of moderate/strenous exercise 6 days         5/24/2024   Social Factors   Frequency of gathering with friends or relatives Never   Worry food won't last until get money to buy more Yes   Food not last or not have enough money for food? No   Do you have housing?  Yes   Are you worried about losing your housing? Yes   Lack of transportation? No   Unable to get utilities (heat,electricity)? No   Want help with housing or utility concern? (!) YES   (!) FOOD SECURITY CONCERN PRESENT(!) HOUSING CONCERN PRESENT(!) SOCIAL CONNECTIONS CONCERN      5/24/2024   Fall Risk   Fallen 2 or more times in the past year? No   Trouble with walking or balance? Yes           5/24/2024   Activities of Daily Living- Home Safety   Needs help with the following daily activites None of the above   Safety concerns in the home None of the above         5/24/2024   Dental   Dentist two times every year? (!) NO         5/24/2024   Hearing Screening   Hearing concerns? (!) I FEEL THAT PEOPLE ARE MUMBLING OR NOT SPEAKING CLEARLY.    (!) I NEED TO ASK PEOPLE TO SPEAK UP OR REPEAT THEMSELVES.    None of the above         5/24/2024   Driving Risk Screening   Patient/family members have  concerns about driving No         5/24/2024   General Alertness/Fatigue Screening   Have you been more tired than usual lately? No         5/24/2024   Urinary Incontinence Screening   Bothered by leaking urine in past 6 months No            Today's PHQ-2 Score:       5/24/2024    11:49 AM   PHQ-2 ( 1999 [employer])   Q1: Little interest or pleasure in doing things 0   Q2: Feeling down, depressed or hopeless 0   PHQ-2 Score 0   Q1: Little interest or pleasure in doing things Not at all   Q2: Feeling down, depressed or hopeless Not at all   PHQ-2 Score 0           5/24/2024   Substance Use   Alcohol more than 3/day or more than 7/wk No   Do you have a current opioid prescription? (!) YES   How severe/bad is pain from 1 to 10? 1/10   Do you use any other substances recreationally? (!) DECLINE       Social History     Tobacco Use    Smoking status: Former     Types: Cigarettes    Smokeless tobacco: Never   Vaping Use    Vaping status: Every Day             5/24/2024   Contraception/Family Planning   Questions about contraception or family planning No         Reviewed and updated as needed this visit by Provider                      Current providers sharing in care for this patient include:  Patient Care Team:  No Ref-Primary, Physician as PCP - Yimi Alexis MD as Assigned PCP    The following health maintenance items are reviewed in Epic and correct as of today:  Health Maintenance   Topic Date Due    ANNUAL REVIEW OF HM ORDERS  Never done    MEDICARE ANNUAL WELLNESS VISIT  12/01/2022    Pneumococcal Vaccine: Pediatrics (0 to 5 Years) and At-Risk Patients (6 to 64 Years) (2 of 2 - PCV) 12/01/2022    COVID-19 Vaccine (4 - 2023-24 season) 09/01/2023    INFLUENZA VACCINE (Season Ended) 09/01/2024    DTAP/TDAP/TD IMMUNIZATION (7 - Td or Tdap) 05/13/2025    GLUCOSE  01/08/2027    ADVANCE CARE PLANNING  05/24/2029    HIV SCREENING  Completed    PHQ-2 (once per calendar year)  Completed    IPV IMMUNIZATION   "Completed    HEPATITIS A IMMUNIZATION  Completed    HEPATITIS B IMMUNIZATION  Completed    HPV IMMUNIZATION  Aged Out    MENINGITIS IMMUNIZATION  Aged Out    RSV MONOCLONAL ANTIBODY  Aged Out         Review of Systems  Constitutional, HEENT, cardiovascular, pulmonary, GI, , musculoskeletal, neuro, skin, endocrine and psych systems are negative, except as otherwise noted.     Objective    Exam  /89   Pulse 88   Temp 97.6  F (36.4  C) (Temporal)   Ht 1.956 m (6' 5\")   Wt 133.9 kg (295 lb 4.8 oz)   SpO2 99%   BMI 35.02 kg/m     Estimated body mass index is 35.02 kg/m  as calculated from the following:    Height as of this encounter: 1.956 m (6' 5\").    Weight as of this encounter: 133.9 kg (295 lb 4.8 oz).    Physical Exam  GENERAL: alert and no distress  EYES: Eyes grossly normal to inspection, PERRL and conjunctivae and sclerae normal  HENT: ear canals and TM's normal, nose and mouth without ulcers or lesions  NECK: no adenopathy, no asymmetry, masses, or scars  RESP: lungs clear to auscultation - no rales, rhonchi or wheezes  CV: regular rate and rhythm, normal S1 S2, no S3 or S4, no murmur, click or rub, no peripheral edema  ABDOMEN: soft, nontender, no hepatosplenomegaly, no masses and bowel sounds normal  MS: Left foot big toe with diffuse swelling, redness, no discharge or warmth or tenderness.  SKIN: no suspicious lesions or rashes  NEURO: Normal strength and tone, mentation intact and speech normal  PSYCH: mentation appears normal, affect normal/bright         No data to display                       Signed Electronically by: Tevin Tadeo MD    "

## 2024-05-24 NOTE — PATIENT INSTRUCTIONS
"Preventive Care Advice   This is general advice we often give to help people stay healthy. Your care team may have specific advice just for you. Please talk to your care team about your own preventive care needs.  Lifestyle  Exercise at least 150 minutes each week (30 minutes a day, 5 days a week).  Do muscle strengthening activities 2 days a week. These help control your weight and prevent disease.  No smoking.  Wear sunscreen to prevent skin cancer.  Have your home tested for radon every 2 to 5 years. Radon is a colorless, odorless gas that can harm your lungs. To learn more, go to [email or web address]. and search for \"Radon in Homes.\"  Keep guns unloaded and locked up in a safe place like a safe or gun vault, or, use a gun lock and hide the keys. Always lock away bullets separately. To learn more, visit [place].gov and search for \"safe gun storage.\"  Nutrition  Eat 5 or more servings of fruits and vegetables each day.  Try wheat bread, brown rice and whole grain pasta (instead of white bread, rice, and pasta).  Get enough calcium and vitamin D. Check the label on foods and aim for 100% of the RDA (recommended daily allowance).  Regular exams  Have a dental exam and cleaning every 6 months.  See your health care team every year to talk about:  Any changes in your health.  Any medicines your care team has prescribed.  Preventive care, family planning, and ways to prevent chronic diseases.  Shots (vaccines)   HPV shots (up to age 26), if you've never had them before.  Hepatitis B shots (up to age 59), if you've never had them before.  COVID-19 shot: Get this shot when it's due.  Flu shot: Get a flu shot every year.  Tetanus shot: Get a tetanus shot every 10 years.  Pneumococcal, hepatitis A, and RSV shots: Ask your care team if you need these based on your risk.  Shingles shot (for age 50 and up).  General health tests  Diabetes screening:  Starting at age 35, Get screened for diabetes at least every 3 years.  If " you are younger than age 35, ask your care team if you should be screened for diabetes.  Cholesterol test: At age 39, start having a cholesterol test every 5 years, or more often if advised.  Bone density scan (DEXA): At age 50, ask your care team if you should have this scan for osteoporosis (brittle bones).  Hepatitis C: Get tested at least once in your life.  Abdominal aortic aneurysm screening: Talk to your doctor about having this screening if you:  Have ever smoked; and  Are biologically male; and  Are between the ages of 65 and 75.  STIs (sexually transmitted infections)  Before age 24: Ask your care team if you should be screened for STIs.  After age 24: Get screened for STIs if you're at risk. You are at risk for STIs (including HIV) if:  You are sexually active with more than one person.  You don't use condoms every time.  You or a partner was diagnosed with a sexually transmitted infection.  If you are at risk for HIV, ask about PrEP medicine to prevent HIV.  Get tested for HIV at least once in your life, whether you are at risk for HIV or not.  Cancer screening tests  Cervical cancer screening: If you have a cervix, begin getting regular cervical cancer screening tests at age 21. Most people who have regular screenings with normal results can stop after age 65. Talk about this with your provider.  Breast cancer scan (mammogram): If you've ever had breasts, begin having regular mammograms starting at age 40. This is a scan to check for breast cancer.  Colon cancer screening: It is important to start screening for colon cancer at age 45.  Have a colonoscopy test every 10 years (or more often if you're at risk) Or, ask your provider about stool tests like a FIT test every year or Cologuard test every 3 years.  To learn more about your testing options, visit: [email or web address].  For help making a decision, visit: [email or web address].  Prostate cancer screening test: If you have a prostate and are age 55  to 69, ask your provider if you would benefit from a yearly prostate cancer screening test.  Lung cancer screening: If you are a current or former smoker age 50 to 80, ask your care team if ongoing lung cancer screenings are right for you.  For informational purposes only. Not to replace the advice of your health care provider. Copyright   2023 [employer]. All rights reserved. Clinically reviewed by the [facility]itions Program. [employer] 213579 - REV 04/24.    Preventing Falls: Care Instructions  Injuries and health problems such as trouble walking or poor eyesight can increase your risk of falling. So can some medicines. But there are things you can do to help prevent falls. You can exercise to get stronger. You can also arrange your home to make it safer.    Talk to your doctor about the medicines you take. Ask if any of them increase the risk of falls and whether they can be changed or stopped.   Try to exercise regularly. It can help improve your strength and balance. This can help lower your risk of falling.     Practice fall safety and prevention.    Wear low-heeled shoes that fit well and give your feet good support. Talk to your doctor if you have foot problems that make this hard.  Carry a cellphone or wear a medical alert device that you can use to call for help.  Use stepladders instead of chairs to reach high objects. Don't climb if you're at risk for falls. Ask for help, if needed.  Wear the correct eyeglasses, if you need them.    Make your home safer.    Remove rugs, cords, clutter, and furniture from walkways.  Keep your house well lit. Use night-lights in hallways and bathrooms.  Install and use sturdy handrails on stairways.  Wear nonskid footwear, even inside. Don't walk barefoot or in socks without shoes.    Be safe outside.    Use handrails, curb cuts, and ramps whenever possible.  Keep your hands free by using a shoulder bag or backpack.  Try to walk in well-lit  "areas. Watch out for uneven ground, changes in pavement, and debris.  Be careful in the winter. Walk on the grass or gravel when sidewalks are slippery. Use de-icer on steps and walkways. Add non-slip devices to shoes.    Put grab bars and nonskid mats in your shower or tub and near the toilet. Try to use a shower chair or bath bench when bathing.   Get into a tub or shower by putting in your weaker leg first. Get out with your strong side first. Have a phone or medical alert device in the bathroom with you.   Where can you learn more?  Go to https://www.[place].net/patiented  Enter G117 in the search box to learn more about \"Preventing Falls: Care Instructions.\"  Current as of: July 17, 2023               Content Version: 14.0    6777-6319 [employer].   Care instructions adapted under license by your healthcare professional. If you have questions about a medical condition or this instruction, always ask your healthcare professional. [employer] disclaims any warranty or liability for your use of this information.      Hearing Loss: Care Instructions  Overview     Hearing loss is a sudden or slow decrease in how well you hear. It can range from slight to profound. Permanent hearing loss can occur with aging. It also can happen when you are exposed long-term to loud noise. Examples include listening to loud music, riding motorcycles, or being around other loud machines.  Hearing loss can affect your work and home life. It can make you feel lonely or depressed. You may feel that you have lost your independence. But hearing aids and other devices can help you hear better and feel connected to others.  Follow-up care is a key part of your treatment and safety. Be sure to make and go to all appointments, and call your doctor if you are having problems. It's also a good idea to know your test results and keep a list of the medicines you take.  How can you care for yourself at home?  Avoid " loud noises whenever possible. This helps keep your hearing from getting worse.  Always wear hearing protection around loud noises.  Wear a hearing aid as directed.  A professional can help you pick a hearing aid that will work best for you.  You can also get hearing aids over the counter for mild to moderate hearing loss.  Have hearing tests as your doctor suggests. They can show whether your hearing has changed. Your hearing aid may need to be adjusted.  Use other devices as needed. These may include:  Telephone amplifiers and hearing aids that can connect to a television, stereo, radio, or microphone.  Devices that use lights or vibrations. These alert you to the doorbell, a ringing telephone, or a baby monitor.  Television closed-captioning. This shows the words at the bottom of the screen. Most new TVs can do this.  TTY (text telephone). This lets you type messages back and forth on the telephone instead of talking or listening. These devices are also called TDD. When messages are typed on the keyboard, they are sent over the phone line to a receiving TTY. The message is shown on a monitor.  Use text messaging, social media, and email if it is hard for you to communicate by telephone.  Try to learn a listening technique called speechreading. It is not lipreading. You pay attention to people's gestures, expressions, posture, and tone of voice. These clues can help you understand what a person is saying. Face the person you are talking to, and have them face you. Make sure the lighting is good. You need to see the other person's face clearly.  Think about counseling if you need help to adjust to your hearing loss.  When should you call for help?  Watch closely for changes in your health, and be sure to contact your doctor if:    You think your hearing is getting worse.     You have new symptoms, such as dizziness or nausea.   Where can you learn more?  Go to https://www.healthwise.net/patiented  Enter R798 in the  "search box to learn more about \"Hearing Loss: Care Instructions.\"  Current as of: September 27, 2023               Content Version: 14.0    5299-9266 [employer].   Care instructions adapted under license by your healthcare professional. If you have questions about a medical condition or this instruction, always ask your healthcare professional. Healthwise[employer] disclaims any warranty or liability for your use of this information.      Relationships for Good Health  Relationships are important for our health and happiness. Social isolation, loneliness and lack of support are bad for your health. Studies show that loneliness can harm health and limit your life span as much as high blood pressure and smoking.   Take some time to reflect on your relationships. Then answer these questions:  Are there people in your life that cause you stress or drain your energy? What can you do to set limits?  ________________________________________________________________________________________________________________________________________________________________________________________________________________________________________________________________________________________________________________________________________________  Who do you enjoy spending time with? Who can you go to for support?  ________________________________________________________________________________________________________________________________________________________________________________________________________________________________________________________________________________________________________________________________________________  What can you do to improve your relationships with " others?  __________________________________________________________________________________________________________________________________________________________________________________________________________________  ______________________________________________________________________________________________________________________________  What do you like most about your relationships with others?  ________________________________________________________________________________________________________________________________________________________________________________________________________________________________________________________________________________________________________________________________________________  My goal: ______________________________________________________________________  I will ______________________________________________________________________________________________________________________________________________________________________________________________    For informational purposes only. Not to replace the advice of your health care provider. Copyright   2018 [facility]. All rights reserved. Clinically reviewed by [facility]  Team. [employer] 752400 - Rev 04/21.    Chronic Pain: Care Instructions  Your Care Instructions     Chronic pain is pain that lasts a long time (months or even years) and may or may not have a clear cause. It is different from acute pain, which usually does have a clear cause--like an injury or illness--and gets better over time. Chronic pain:  Lasts over time but may vary from day to day.  Does not go away despite efforts to end it.  May disrupt your sleep and lead to fatigue.  May cause depression or anxiety.  May make your muscles tense, causing more pain.  Can disrupt your work, hobbies, home life, and relationships with friends and family.  Chronic pain is a very real condition. It is not just in your  head. Treatment can help and usually includes several methods used together, such as medicines, physical therapy, exercise, and other treatments. Learning how to relax and changing negative thought patterns can also help you cope.  Chronic pain is complex. Taking an active role in your treatment will help you better manage your pain. Tell your doctor if you have trouble dealing with your pain. You may have to try several things before you find what works best for you.  Follow-up care is a key part of your treatment and safety. Be sure to make and go to all appointments, and call your doctor if you are having problems. It's also a good idea to know your test results and keep a list of the medicines you take.  How can you care for yourself at home?  Pace yourself. Break up large jobs into smaller tasks. Save harder tasks for days when you have less pain, or go back and forth between hard tasks and easier ones. Take rest breaks.  Relax, and reduce stress. Relaxation techniques such as deep breathing or meditation can help.  Keep moving. Gentle, daily exercise can help reduce pain over the long run. Try low- or no-impact exercises such as walking, swimming, and stationary biking. Do stretches to stay flexible.  Try heat, cold packs, and massage.  Get enough sleep. Chronic pain can make you tired and drain your energy. Talk with your doctor if you have trouble sleeping because of pain.  Think positive. Your thoughts can affect your pain level. Do things that you enjoy to distract yourself when you have pain instead of focusing on the pain. See a movie, read a book, listen to music, or spend time with a friend.  If you think you are depressed, talk to your doctor about treatment.  Keep a daily pain diary. Record how your moods, thoughts, sleep patterns, activities, and medicine affect your pain. You may find that your pain is worse during or after certain activities or when you are feeling a certain emotion. Having a  "record of your pain can help you and your doctor find the best ways to treat your pain.  Take pain medicines exactly as directed.  If the doctor gave you a prescription medicine for pain, take it as prescribed.  If you are not taking a prescription pain medicine, ask your doctor if you can take an over-the-counter medicine.  Reducing constipation caused by pain medicine  Talk to your doctor about a laxative. If a laxative doesn't work, your doctor may suggest a prescription medicine.  Include fruits, vegetables, beans, and whole grains in your diet each day. These foods are high in fiber.  If your doctor recommends it, get more exercise. Walking is a good choice. Bit by bit, increase the amount you walk every day. Try for at least 30 minutes on most days of the week.  Schedule time each day for a bowel movement. A daily routine may help. Take your time and do not strain when having a bowel movement.  When should you call for help?   Call your doctor now or seek immediate medical care if:    Your pain gets worse or is out of control.     You feel down or blue, or you do not enjoy things like you once did. You may be depressed, which is common in people with chronic pain. Depression can be treated.     You have vomiting or cramps for more than 2 hours.   Watch closely for changes in your health, and be sure to contact your doctor if:    You cannot sleep because of pain.     You are very worried or anxious about your pain.     You have trouble taking your pain medicine.     You have any concerns about your pain medicine.     You have trouble with bowel movements, such as:  No bowel movement in 3 days.  Blood in the anal area, in your stool, or on the toilet paper.  Diarrhea for more than 24 hours.   Where can you learn more?  Go to https://www.healthwise.net/patiented  Enter N004 in the search box to learn more about \"Chronic Pain: Care Instructions.\"  Current as of: July 10, 2023               Content Version: 14.0    " 7440-7215 [employer].   Care instructions adapted under license by your healthcare professional. If you have questions about a medical condition or this instruction, always ask your healthcare professional. [employer] disclaims any warranty or liability for your use of this information.

## 2024-05-24 NOTE — COMMUNITY RESOURCES LIST (ENGLISH)
May 24, 2024           YOUR PERSONALIZED LIST OF SERVICES & PROGRAMS           NAVIGATION    Eligibility Screening      [facility] application assistance  [address] [place] [address] (Distance: 2.5 miles)  Phone: [phone]) [phone]  Website: https://www.[facility]/ph/public-health  Language: English  Fee: Free  Accessibility: Translation services, Ada accessible      [employer] [place] - SNAP (formerly food stamps) Screening and Application help  Phone: (927) 180-3385  Website: https://www.[employer].org/programs/[place]-food-helpline/  Language: English  Hours: Mon 10:00 AM - 5:00 PM Tue 10:00 AM - 5:00 PM Wed 10:00 AM - 5:00 PM Thu 10:00 AM - 5:00 PM Fri 10:00 AM - 5:00 PM  Fee: Free  Accessibility: Ada accessible, Blind accommodation, Deaf or hard of hearing, Translation services      [employer] - Certified Application Counselor (CAC)  Phone: ([phone] [phone]  Website: https://www.[facility].org/about-us/assister-program/cacs/index.jsp  Language: English  Hours: Mon 8:00 AM - 4:00 PM Tue 8:00 AM - 4:00 PM Wed 8:00 AM - 4:00 PM Thu 8:00 AM - 4:00 PM        ASSISTANCE    Nutrition Benefits      [facility] application assistance  [address] [place] [address] (Distance: 2.5 miles)  Phone: ([phone]) [phone]  Website: https://www.[facility]/ph/public-health  Language: English  Fee: Free  Accessibility: Translation services, Ada accessible      [employer] [place] - SNAP (formerly food stamps) Screening and Application help  Phone: (399) 459-9179  Website: https://www.[employer].org/programs/[place]-food-helpline/  Language: English  Hours: Mon 10:00 AM - 5:00 PM Tue 10:00 AM - 5:00 PM Wed 10:00 AM - 5:00 PM Thu 10:00 AM - 5:00 PM Fri 10:00 AM - 5:00 PM  Fee: Free  Accessibility: Ada accessible, Blind accommodation, Deaf or hard of hearing, Translation services      [employer] [place] - [employer] [place]  Phone: (459) [phone]  Website:  https://www.hungersolutions.org/programs/market-bucks/  Language: English  Hours: Mon 10:00 AM - 5:00 PM Tue 10:00 AM - 5:00 PM Wed 10:00 AM - 5:00 PM Thu 10:00 AM - 5:00 PM Fri 10:00 AM - 5:00 PM  Fee: Self pay    Pantry      in the [facility] - Food in the [facility] at [facility]  [address] [place] [address] (Distance: 3.1 miles)  Phone: [phone] [phone]  Website: https://www.goodCatamaranhood.org/our-programs/feeding-the-future/food-in-the-handley/  Language: English  Fee: Free  Accessibility: Ada accessible      [facility] - [facility] Food Shelf  [address] [place] [address] (Distance: 2.9 miles)  Phone: [phone]) [phone]  Website: http://www.saintbonaventNext [employer] Greatness.SCL  Language: English, Burkinan  Fee: Free  Accessibility: Ada accessible      [employer] Shelf - [place] [facility]  Phone: ([phone] [phone]  Website: www.[employer].org  Language: English, Burkinan  Hours: Mon 9:00 AM - 3:30 PM Tue 9:00 AM - 6:30 PM Wed 9:00 AM - 3:30 PM Thu 9:00 AM - 12:30 PM Fri 9:00 AM - 12:30 PM Sat 9:00 AM - 12:00 PM  Fee: Free        & SHELTER    Case Management      Living - Housing Stabilization Services  [address] [place] [address] (Distance: 7.5 miles)  Phone: [phone]) [phone]  Website: https://www.[employer]  Language: Khmer, English, Chadian  Fee: Insurance, Self pay      [employer] - Integrated Community Support Services (ICS) and Individualized Home Support (IHS)  Phone: (635) 640-3607  Website: https://www.opportunityi.org/  Language: Khmer, English, Australian, Hmong, Chadian, Burkinan  Hours: Mon 8:00 AM - 5:00 PM Tue 8:00 AM - 5:00 PM Wed 8:00 AM - 5:00 PM Thu 8:00 AM - 5:00 PM Fri 8:00 AM - 5:00 PM  Fee: Insurance  Accessibility: Blind accommodation, Deaf or hard of hearing, Translation services      [employer] - Housing Stabilization Services  Phone: (410) 745-2000  Website:  https://Yippy/  Language: English  Hours: Mon 8:00 AM - 4:00 PM Tue 8:00 AM - 4:00 PM Wed 8:00 AM - 4:00 PM Thu 8:00 AM - 4:00 PM Fri 8:00 AM - 4:00 PM  Fee: Free  Accessibility: Blind accommodation, Deaf or hard of hearing  Transportation Options: Free transportation    Payment Assistance      [facility] - Rent / Mortgage  [address] [place] [address] (Distance: 12.8 miles)  Phone: (718) 746-9457  Website: https://C[employer]ite/get-assistance/rent-mortgage/  Language: English  Fee: Free  Accessibility: Ada accessible      [employer] [place] - EMERGENCY RENTAL ASSISTANCE (ERA) PROGRAM  Phone: (730) 717-1168  Website: http://www.Kauli.org      [employer] - Keep the Key  Phone: (272) 805-1357  Website: https://www.cwq83-qjokpxwdollows.org/programs.html  Language: English  Hours: Mon 7:00 AM - 7:00 PM Tue 7:00 AM - 7:00 PM Wed 7:00 AM - 7:00 PM Thu 7:00 AM - 7:00 PM Fri 7:00 AM - 7:00 PM  Fee: Free    Mediation & Eviction Prevention      Living - Housing Stabilization Services  [address] [place] [address] (Distance: 7.5 miles)  Phone: (570) 603-3147  Website: https://www.[employer]  Language: Bengali, English, [place] citizen  Fee: Insurance, Self pay      [facility] - Tenant Rights / Eviction Prevention  Website: https://Techcafe.iomn.org/h-atey-gq-/  Language: English, Yemeni      [facility] - Housing Stabilization Services  Phone: (807) 141-5332  Website: https://TheTakes/Housing-Stabilization.html  Language: English  Hours: Mon 9:00 AM - 5:00 PM Tue 9:00 AM - 5:00 PM Wed 9:00 AM - 5:00 PM Thu 9:00 AM - 5:00 PM Fri 9:00 AM - 5:00 PM  Fee: Insurance  Accessibility: Deaf or hard of hearing, Translation services               IMPORTANT NUMBERS & WEBSITES        Emergency Services  911  .   [facility]  211 http://211unitedway.org  .   Poison Control  (425) 721-2310 http://mnpoison.org http://wisconsinpoison.org  .     Suicide and  Crisis Lifeline  988 http://988lifeline.org  .   [facility]help [place] Child Abuse Hotline  938.262.2857 http://Childhelphotline.org   .   National Sexual Assault Hotline  (701) 987-7181 (HOPE) http://Rainn.org   .     National Runaway Safeline  (553) 290-8308 (RUNAWAY) http://Ludei.Ocular Therapeutix  .   Pregnancy & Postpartum Support  Call/text 387-882-1447  [place]: http://ppsupportmn.org  [place]: http://psichapters.com/wi  .   Substance Abuse National Helpline ([facility])  083-026-HELP (2950) http://Findtreatment.gov   .                DISCLAIMER: These resources have been generated via the [employer] Platform. [employer] does not endorse any service providers mentioned in this resource list. [employer] does not guarantee that the services mentioned in this resource list will be available to you or will improve your health or wellness.    [facility]

## 2024-09-17 ENCOUNTER — HOSPITAL ENCOUNTER (EMERGENCY)
Facility: CLINIC | Age: 39
Discharge: HOME | End: 2024-09-17
Attending: EMERGENCY MEDICINE | Admitting: EMERGENCY MEDICINE
Payer: COMMERCIAL

## 2024-09-17 VITALS
BODY MASS INDEX: 34.24 KG/M2 | HEIGHT: 77 IN | DIASTOLIC BLOOD PRESSURE: 79 MMHG | HEART RATE: 80 BPM | OXYGEN SATURATION: 98 % | TEMPERATURE: 98.1 F | WEIGHT: 290 LBS | RESPIRATION RATE: 20 BRPM | SYSTOLIC BLOOD PRESSURE: 123 MMHG

## 2024-09-17 DIAGNOSIS — B34.9 VIRAL ILLNESS: ICD-10-CM

## 2024-09-17 LAB
FLUAV RNA SPEC QL NAA+PROBE: NEGATIVE
FLUBV RNA RESP QL NAA+PROBE: NEGATIVE
RSV RNA SPEC NAA+PROBE: NEGATIVE
SARS-COV-2 RNA RESP QL NAA+PROBE: NEGATIVE

## 2024-09-17 PROCEDURE — 99283 EMERGENCY DEPT VISIT LOW MDM: CPT

## 2024-09-17 PROCEDURE — 87637 SARSCOV2&INF A&B&RSV AMP PRB: CPT | Performed by: EMERGENCY MEDICINE

## 2024-09-17 ASSESSMENT — ACTIVITIES OF DAILY LIVING (ADL)
ADLS_ACUITY_SCORE: 35
ADLS_ACUITY_SCORE: 35

## 2024-09-17 NOTE — ED TRIAGE NOTES
"Patient presents to the ER for elaluation of \"being sick\" for a few days. Patient comes from a group home with staff where he has had fever/chills at home for the last 3 days. Patient reports that today he feels much better but staff wanted him checked out \"just in case\".      Triage Assessment (Adult)       Row Name 09/17/24 4429          Triage Assessment    Airway WDL WDL        Respiratory WDL    Respiratory WDL WDL        Skin Circulation/Temperature WDL    Skin Circulation/Temperature WDL WDL        Cardiac WDL    Cardiac WDL WDL        Peripheral/Neurovascular WDL    Peripheral Neurovascular WDL WDL        Cognitive/Neuro/Behavioral WDL    Cognitive/Neuro/Behavioral WDL X;WDL                     "

## 2024-09-17 NOTE — ED PROVIDER NOTES
"  Emergency Department Note      History of Present Illness     Chief Complaint  Cold Symptoms    HPI  Nima Pandya is a 39 year old adult with history of paranoid schizophrenia who presents to the ED with a staff member for evaluation of cold symptoms. He reports having cold symptoms of constant cold sweats and odd dreams. Denies fever, cough, sore throat, runny nose, urinary symptoms, shortness of breath, nausea, vomiting, or abdominal pain. Patient wanted to be checked out before going back to his group home. Patient smokes cigarettes.     Independent Historian  None    Review of External Notes  None  Past Medical History   Medical History and Problem List  Prediabetes  Suicidal behavior  Tobacco dependency   Paranoid schizophrenia   Opioid use disorder   Depressive disorder  [employer]    Medications  Suboxone  Wellbutrin  Klonopin  Gabapentin   Atarax  Zyprexa  Trazodone     Surgical History   Nasal septum disorder   Physical Exam   Patient Vitals for the past 24 hrs:   BP Temp Temp src Pulse Resp SpO2 Height Weight   09/17/24 1852 -- -- -- -- -- 98 % -- --   09/17/24 1851 123/79 -- -- 80 -- -- -- --   09/17/24 1645 -- 98.1  F (36.7  C) Temporal -- -- -- -- --   09/17/24 1642 120/79 (!) 96.4  F (35.8  C) Temporal 86 20 97 % 1.956 m (6' 5\") 131.5 kg (290 lb)     Physical Exam  Constitutional: Vital signs reviewed as above  General: Alert  HEENT: Moist mucous membranes  Eyes: Conjunctiva normal.   Neck: Normal range of motion  Cardiovascular: Regular rate, Regular rhythm and normal heart sounds.  No MRG  Pulmonary/Chest: Effort normal and breath sounds normal. No respiratory distress. Patient has no wheezes. Patient has no rales.   Abdominal: Soft. Positive bowel sounds. No MRG.  Musculoskeletal/Extremities: Full ROM.  Endo: No pitting edema  Neurological: Alert, no focal deficits.  Skin: Skin is warm and dry.   Psychiatric: Pleasant      Diagnostics   Lab Results   Labs Ordered and Resulted from Time of ED " Arrival to Time of ED Departure   INFLUENZA A/B, RSV, & SARS-COV2 PCR - Normal       Result Value    Influenza A PCR Negative      Influenza B PCR Negative      RSV PCR Negative      SARS CoV2 PCR Negative       ED Course    Medications Administered  Medications - No data to display    Procedures  Procedures     Discussion of Management  None    Optional/Additional Documentation  None    ED Course  ED Course as of 09/17/24 2242   Tue Sep 17, 2024   1838 I obtained history and examined the patient as noted above.    1841 I prepared the patient to be discharged home.      Medical Decision Making / Diagnosis   CMS Diagnoses: None    MIPS     None    MDM  Patient presents from his group home.  He states that he is had some cold chills and sweats over the past couple of days.  He also has been having some very lucid and disturbing dreams.  Basically his group home symptoms here to make sure he did have a thing contagious.  He states that his symptoms have all improved.  He denies any nausea, vomiting, cough, runny nose or sore throat.  Signs here look good.  His COVID/RSV/influenza were negative.  I do think he is safe to discharge back to his facility.  He is comfortable the plan.  He has no concerns at the moment.  He was discharged.    Disposition  The patient was discharged.     ICD-10 Codes:    ICD-10-CM    1. Viral illness  B34.9          Discharge Medications  Discharge Medication List as of 9/17/2024  6:48 PM        Scribe Disclosure:  I, Autumn Jacobo, am serving as a scribe at 6:55 PM on 9/17/2024 to document services personally performed by Austin Oquendo MDbased on my observations and the provider's statements to me.      Austin Oquendo MD  09/17/24 4422

## 2024-09-18 ENCOUNTER — PATIENT OUTREACH (OUTPATIENT)
Dept: INTERNAL MEDICINE | Facility: CLINIC | Age: 39
End: 2024-09-18
Payer: COMMERCIAL

## 2025-01-21 ENCOUNTER — MEDICAL CORRESPONDENCE (OUTPATIENT)
Dept: HEALTH INFORMATION MANAGEMENT | Facility: CLINIC | Age: 40
End: 2025-01-21
Payer: COMMERCIAL

## 2025-02-21 ENCOUNTER — MEDICAL CORRESPONDENCE (OUTPATIENT)
Dept: HEALTH INFORMATION MANAGEMENT | Facility: CLINIC | Age: 40
End: 2025-02-21
Payer: COMMERCIAL

## 2025-05-08 ENCOUNTER — PATIENT OUTREACH (OUTPATIENT)
Dept: CARE COORDINATION | Facility: CLINIC | Age: 40
End: 2025-05-08
Payer: COMMERCIAL

## 2025-07-13 ENCOUNTER — HEALTH MAINTENANCE LETTER (OUTPATIENT)
Age: 40
End: 2025-07-13